# Patient Record
Sex: FEMALE | Race: WHITE | Employment: OTHER | URBAN - METROPOLITAN AREA
[De-identification: names, ages, dates, MRNs, and addresses within clinical notes are randomized per-mention and may not be internally consistent; named-entity substitution may affect disease eponyms.]

---

## 2018-03-21 ENCOUNTER — HOSPITAL ENCOUNTER (EMERGENCY)
Age: 83
Discharge: HOME | End: 2018-03-21
Attending: EMERGENCY MEDICINE
Payer: MEDICARE

## 2018-03-21 ENCOUNTER — APPOINTMENT (OUTPATIENT)
Dept: CT IMAGING | Age: 83
End: 2018-03-21
Attending: PHYSICIAN ASSISTANT
Payer: MEDICARE

## 2018-03-21 VITALS
OXYGEN SATURATION: 94 % | HEIGHT: 63 IN | TEMPERATURE: 97.5 F | RESPIRATION RATE: 18 BRPM | HEART RATE: 104 BPM | WEIGHT: 125 LBS | SYSTOLIC BLOOD PRESSURE: 194 MMHG | BODY MASS INDEX: 22.15 KG/M2 | DIASTOLIC BLOOD PRESSURE: 55 MMHG

## 2018-03-21 DIAGNOSIS — R00.1 BRADYCARDIA: ICD-10-CM

## 2018-03-21 DIAGNOSIS — I10 HYPERTENSION, UNSPECIFIED TYPE: ICD-10-CM

## 2018-03-21 DIAGNOSIS — R53.83 FATIGUE, UNSPECIFIED TYPE: Primary | ICD-10-CM

## 2018-03-21 DIAGNOSIS — I50.9 CONGESTIVE HEART FAILURE, UNSPECIFIED CONGESTIVE HEART FAILURE CHRONICITY, UNSPECIFIED CONGESTIVE HEART FAILURE TYPE: ICD-10-CM

## 2018-03-21 LAB
ALBUMIN SERPL-MCNC: 3.3 G/DL (ref 3.4–5)
ALBUMIN/GLOB SERPL: 0.9 {RATIO} (ref 0.8–1.7)
ALP SERPL-CCNC: 77 U/L (ref 45–117)
ALT SERPL-CCNC: 13 U/L (ref 13–56)
AMORPH CRY URNS QL MICRO: ABNORMAL
ANION GAP SERPL CALC-SCNC: 10 MMOL/L (ref 3–18)
APPEARANCE UR: CLEAR
AST SERPL-CCNC: 19 U/L (ref 15–37)
BACTERIA URNS QL MICRO: ABNORMAL /HPF
BASOPHILS # BLD: 0 K/UL (ref 0–0.06)
BASOPHILS NFR BLD: 0 % (ref 0–2)
BILIRUB SERPL-MCNC: 0.8 MG/DL (ref 0.2–1)
BILIRUB UR QL: NEGATIVE
BNP SERPL-MCNC: 7218 PG/ML (ref 0–1800)
BUN SERPL-MCNC: 26 MG/DL (ref 7–18)
BUN/CREAT SERPL: 17 (ref 12–20)
CALCIUM SERPL-MCNC: 8.7 MG/DL (ref 8.5–10.1)
CHLORIDE SERPL-SCNC: 106 MMOL/L (ref 100–108)
CK MB CFR SERPL CALC: 2.7 % (ref 0–4)
CK MB SERPL-MCNC: 1.3 NG/ML (ref 5–25)
CK SERPL-CCNC: 48 U/L (ref 26–192)
CO2 SERPL-SCNC: 26 MMOL/L (ref 21–32)
COLOR UR: YELLOW
CREAT SERPL-MCNC: 1.51 MG/DL (ref 0.6–1.3)
DIFFERENTIAL METHOD BLD: ABNORMAL
EOSINOPHIL # BLD: 0.2 K/UL (ref 0–0.4)
EOSINOPHIL NFR BLD: 5 % (ref 0–5)
EPITH CASTS URNS QL MICRO: 0 /LPF (ref 0–5)
ERYTHROCYTE [DISTWIDTH] IN BLOOD BY AUTOMATED COUNT: 14.6 % (ref 11.6–14.5)
GLOBULIN SER CALC-MCNC: 3.7 G/DL (ref 2–4)
GLUCOSE BLD STRIP.AUTO-MCNC: 113 MG/DL (ref 70–110)
GLUCOSE SERPL-MCNC: 110 MG/DL (ref 74–99)
GLUCOSE UR STRIP.AUTO-MCNC: NEGATIVE MG/DL
HCT VFR BLD AUTO: 36.7 % (ref 35–45)
HGB BLD-MCNC: 12.2 G/DL (ref 12–16)
HGB UR QL STRIP: ABNORMAL
KETONES UR QL STRIP.AUTO: NEGATIVE MG/DL
LEUKOCYTE ESTERASE UR QL STRIP.AUTO: NEGATIVE
LYMPHOCYTES # BLD: 1.3 K/UL (ref 0.9–3.6)
LYMPHOCYTES NFR BLD: 25 % (ref 21–52)
MAGNESIUM SERPL-MCNC: 2.3 MG/DL (ref 1.6–2.6)
MCH RBC QN AUTO: 30.5 PG (ref 24–34)
MCHC RBC AUTO-ENTMCNC: 33.2 G/DL (ref 31–37)
MCV RBC AUTO: 91.8 FL (ref 74–97)
MONOCYTES # BLD: 0.5 K/UL (ref 0.05–1.2)
MONOCYTES NFR BLD: 10 % (ref 3–10)
NEUTS SEG # BLD: 3.2 K/UL (ref 1.8–8)
NEUTS SEG NFR BLD: 60 % (ref 40–73)
NITRITE UR QL STRIP.AUTO: NEGATIVE
PH UR STRIP: 5 [PH] (ref 5–8)
PLATELET # BLD AUTO: 194 K/UL (ref 135–420)
PMV BLD AUTO: 10.2 FL (ref 9.2–11.8)
POTASSIUM SERPL-SCNC: 5.5 MMOL/L (ref 3.5–5.5)
PROT SERPL-MCNC: 7 G/DL (ref 6.4–8.2)
PROT UR STRIP-MCNC: 100 MG/DL
RBC # BLD AUTO: 4 M/UL (ref 4.2–5.3)
RBC #/AREA URNS HPF: ABNORMAL /HPF (ref 0–5)
SODIUM SERPL-SCNC: 142 MMOL/L (ref 136–145)
SP GR UR REFRACTOMETRY: 1.02 (ref 1–1.03)
TROPONIN I SERPL-MCNC: <0.02 NG/ML (ref 0–0.06)
UROBILINOGEN UR QL STRIP.AUTO: 0.2 EU/DL (ref 0.2–1)
WBC # BLD AUTO: 5.3 K/UL (ref 4.6–13.2)
WBC URNS QL MICRO: ABNORMAL /HPF (ref 0–5)

## 2018-03-21 PROCEDURE — 74011250637 HC RX REV CODE- 250/637: Performed by: PHYSICIAN ASSISTANT

## 2018-03-21 PROCEDURE — 80053 COMPREHEN METABOLIC PANEL: CPT | Performed by: EMERGENCY MEDICINE

## 2018-03-21 PROCEDURE — 77030034849

## 2018-03-21 PROCEDURE — 81001 URINALYSIS AUTO W/SCOPE: CPT | Performed by: EMERGENCY MEDICINE

## 2018-03-21 PROCEDURE — 83735 ASSAY OF MAGNESIUM: CPT | Performed by: EMERGENCY MEDICINE

## 2018-03-21 PROCEDURE — 51702 INSERT TEMP BLADDER CATH: CPT

## 2018-03-21 PROCEDURE — 83880 ASSAY OF NATRIURETIC PEPTIDE: CPT | Performed by: EMERGENCY MEDICINE

## 2018-03-21 PROCEDURE — 85025 COMPLETE CBC W/AUTO DIFF WBC: CPT | Performed by: EMERGENCY MEDICINE

## 2018-03-21 PROCEDURE — 93005 ELECTROCARDIOGRAM TRACING: CPT

## 2018-03-21 PROCEDURE — 82962 GLUCOSE BLOOD TEST: CPT

## 2018-03-21 PROCEDURE — 87086 URINE CULTURE/COLONY COUNT: CPT | Performed by: EMERGENCY MEDICINE

## 2018-03-21 PROCEDURE — 70450 CT HEAD/BRAIN W/O DYE: CPT

## 2018-03-21 PROCEDURE — 82550 ASSAY OF CK (CPK): CPT | Performed by: EMERGENCY MEDICINE

## 2018-03-21 PROCEDURE — 99285 EMERGENCY DEPT VISIT HI MDM: CPT

## 2018-03-21 RX ORDER — AMLODIPINE BESYLATE 5 MG/1
5 TABLET ORAL
Status: DISCONTINUED | OUTPATIENT
Start: 2018-03-21 | End: 2018-03-21

## 2018-03-21 RX ORDER — FUROSEMIDE 20 MG/1
TABLET ORAL
Qty: 10 TAB | Refills: 0 | Status: SHIPPED | OUTPATIENT
Start: 2018-03-21 | End: 2018-06-01

## 2018-03-21 RX ORDER — HYDROCHLOROTHIAZIDE 25 MG/1
25 TABLET ORAL
Status: COMPLETED | OUTPATIENT
Start: 2018-03-21 | End: 2018-03-21

## 2018-03-21 RX ORDER — FAMOTIDINE 20 MG/1
20 TABLET, FILM COATED ORAL 2 TIMES DAILY
COMMUNITY

## 2018-03-21 RX ADMIN — HYDROCHLOROTHIAZIDE 25 MG: 25 TABLET ORAL at 18:57

## 2018-03-21 NOTE — ED NOTES
Pt A&O x 4, speaking in full, clear sentences. Placed dominguez, pt tolerated well. Pt is bradycardic 42-49 bpm with SpO2 on RA between 93-95%. Started supportive oxygen 3 L/min NC. No further needs expressed.

## 2018-03-21 NOTE — ED PROVIDER NOTES
EMERGENCY DEPARTMENT HISTORY AND PHYSICAL EXAM    Date: 3/21/2018  Patient Name: Corey Brothers    History of Presenting Illness     Chief Complaint   Patient presents with    Fatigue         History Provided By: Patient    Chief Complaint: fatigue  Duration: 3 Hours  Timing:  Acute   Quality: generalized  Severity: Moderate  Modifying Factors: none  Associated Symptoms: elevated BP    Additional History (Context):   5:50 PM  Corey Brothers is a 80 y.o. female with PMHX of HTN who presents to the emergency department C/O fatigue. Associated sxs include elevated BP. Son reports the pt had received a call from [facility] that his mother had an elevated BP and was not responding. Pt was able to recall what she ate for breakfast and lunch. Pt states she has chronic right hip pain. PMHx includes cancer, HLD, and GERD. PSHx includes lower back surgery, appendectomy, right breast lumpectomy, and sentinel lymph node biopsy. Pt denies headache, SOB, chest pain, blurred vision, slurred speech, dysuria, hematuria, frequency, recent falls, use of blood thinners  and any other sxs or complaints. PCP: Bernice Law MD    Current Outpatient Prescriptions   Medication Sig Dispense Refill    famotidine (PEPCID) 20 mg tablet Take 20 mg by mouth two (2) times a day.  furosemide (LASIX) 20 mg tablet One tab daily 10 Tab 0    Aspirin, Buffered 81 mg tab Take  by mouth daily.  carica papaya (PAPAYA ENZYME) chew Take  by mouth daily.  omega-3 fatty acids-vitamin e (FISH OIL) 1,000 mg cap Take 1 Cap by mouth daily.  CALCIUM CARBONATE/VITAMIN D3 (CALTRATE 600 + D PO) Take  by mouth daily.  BIOTIN PO Take 1,000 mcg by mouth daily.  flaxseed oil 1,000 mg cap Take  by mouth daily.  ascorbic acid (VITAMIN C) 500 mg tablet Take 1,000 mg by mouth daily.  FEVERFEW PO Take  by mouth daily.  Cholecalciferol, Vitamin D3, 1,000 unit cap Take  by mouth daily.       cinnamon bark (CINNAMON) 500 mg cap Take 1,000 mg by mouth daily. Past History     Past Medical History:  Past Medical History:   Diagnosis Date    Cancer ([address]     right breast    GERD (gastroesophageal reflux disease)     Hyperlipidemia     Hypertension        Past Surgical History:  Past Surgical History:   Procedure Laterality Date    HX APPENDECTOMY  1943    NEUROLOGICAL PROCEDURE UNLISTED  2010    lower back surgery       Family History:  History reviewed. No pertinent family history. Social History:  Social History   Substance Use Topics    Smoking status: Never Smoker    Smokeless tobacco: Never Used    Alcohol use No       Allergies: Allergies   Allergen Reactions    Advil [Ibuprofen] Other (comments)     Upset stomach         Review of Systems   Review of Systems   Constitutional: Positive for fatigue.        (+) Elevated BP   Eyes: Negative for visual disturbance (blurred vision). Respiratory: Negative for shortness of breath. Cardiovascular: Negative for chest pain. Genitourinary: Negative for dysuria, frequency and hematuria. Neurological: Negative for speech difficulty (slurred speech) and headaches. All other systems reviewed and are negative. Physical Exam     Vitals:    03/21/18 2100 03/21/18 2130 03/21/18 2135 03/21/18 2212   BP: 190/46 183/42 198/45 194/55   Pulse: (!) 43 (!) 46 (!) 47 (!) 104   Resp: 26 16 18    Temp:       SpO2:  95% 95% 94%   Weight:       Height:         Physical Exam   Constitutional: She is oriented to person, place, and time. She appears well-developed and well-nourished. Alert, oriented x4, NAD    HENT:   Head: Normocephalic and atraumatic. Right Ear: Tympanic membrane, external ear and ear canal normal. No mastoid tenderness. Tympanic membrane is not perforated, not erythematous, not retracted and not bulging. No hemotympanum. Left Ear: Tympanic membrane, external ear and ear canal normal. No mastoid tenderness.  Tympanic membrane is not perforated, not erythematous, not retracted and not bulging. No hemotympanum. Nose: Nose normal.   Mouth/Throat: Uvula is midline, oropharynx is clear and moist and mucous membranes are normal. No trismus in the jaw. No uvula swelling. No oropharyngeal exudate, posterior oropharyngeal edema, posterior oropharyngeal erythema or tonsillar abscesses. Eyes: EOM are normal. Pupils are equal, round, and reactive to light. Neck: Normal range of motion. Neck supple. No spinous process tenderness and no muscular tenderness present. No rigidity. Normal range of motion present. No Brudzinski's sign and no Kernig's sign noted. No meningismus   No lymphadenopathy    Cardiovascular: Regular rhythm, normal heart sounds and intact distal pulses. Bradycardia present. No murmur heard. Pulmonary/Chest: Effort normal and breath sounds normal. No respiratory distress. She has no wheezes. She has no rales. Abdominal: Soft. Bowel sounds are normal. She exhibits no distension. There is no tenderness. There is no rebound and no guarding. Musculoskeletal: Normal range of motion. Neurological: She is alert and oriented to person, place, and time. She has normal strength. She displays no atrophy and no tremor. No cranial nerve deficit or sensory deficit. She exhibits normal muscle tone. Coordination and gait normal. GCS eye subscore is 4. GCS verbal subscore is 5. GCS motor subscore is 6. No neuro deficit   N/V intact distally   Strength 5/5 and equal in all extremities   No slurred speech   Left eyelid appears lower than right but pt sts this is chronic no other facial droop   Skin: Skin is warm and dry. Psychiatric: She has a normal mood and affect. Judgment normal.   Nursing note and vitals reviewed.         Diagnostic Study Results     Labs -     Recent Results (from the past 12 hour(s))   URINALYSIS W/ RFLX MICROSCOPIC    Collection Time: 03/21/18  5:50 PM   Result Value Ref Range    Color YELLOW      Appearance CLEAR      Specific gravity 1.020 1.005 - 1.030      pH (UA) 5.0 5.0 - 8.0      Protein 100 (A) NEG mg/dL    Glucose NEGATIVE  NEG mg/dL    Ketone NEGATIVE  NEG mg/dL    Bilirubin NEGATIVE  NEG      Blood SMALL (A) NEG      Urobilinogen 0.2 0.2 - 1.0 EU/dL    Nitrites NEGATIVE  NEG      Leukocyte Esterase NEGATIVE  NEG     URINE MICROSCOPIC ONLY    Collection Time: 03/21/18  5:50 PM   Result Value Ref Range    WBC 0 to 3 0 - 5 /hpf    RBC 0 to 3 0 - 5 /hpf    Epithelial cells 0 0 - 5 /lpf    Bacteria 1+ (A) NEG /hpf    Amorphous Crystals FEW (A) NEG     CBC WITH AUTOMATED DIFF    Collection Time: 03/21/18  6:18 PM   Result Value Ref Range    WBC 5.3 4.6 - 13.2 K/uL    RBC 4.00 (L) 4.20 - 5.30 M/uL    HGB 12.2 12.0 - 16.0 g/dL    HCT 36.7 35.0 - 45.0 %    MCV 91.8 74.0 - 97.0 FL    MCH 30.5 24.0 - 34.0 PG    MCHC 33.2 31.0 - 37.0 g/dL    RDW 14.6 (H) 11.6 - 14.5 %    PLATELET 427 613 - 191 K/uL    MPV 10.2 9.2 - 11.8 FL    NEUTROPHILS 60 40 - 73 %    LYMPHOCYTES 25 21 - 52 %    MONOCYTES 10 3 - 10 %    EOSINOPHILS 5 0 - 5 %    BASOPHILS 0 0 - 2 %    ABS. NEUTROPHILS 3.2 1.8 - 8.0 K/UL    ABS. LYMPHOCYTES 1.3 0.9 - 3.6 K/UL    ABS. MONOCYTES 0.5 0.05 - 1.2 K/UL    ABS. EOSINOPHILS 0.2 0.0 - 0.4 K/UL    ABS. BASOPHILS 0.0 0.0 - 0.06 K/UL    DF AUTOMATED     METABOLIC PANEL, COMPREHENSIVE    Collection Time: 03/21/18  6:18 PM   Result Value Ref Range    Sodium 142 136 - 145 mmol/L    Potassium 5.5 3.5 - 5.5 mmol/L    Chloride 106 100 - 108 mmol/L    CO2 26 21 - 32 mmol/L    Anion gap 10 3.0 - 18 mmol/L    Glucose 110 (H) 74 - 99 mg/dL    BUN 26 (H) 7.0 - 18 MG/DL    Creatinine 1.51 (H) 0.6 - 1.3 MG/DL    BUN/Creatinine ratio 17 12 - 20      GFR est AA 39 (L) >60 ml/min/1.73m2    GFR est non-AA 32 (L) >60 ml/min/1.73m2    Calcium 8.7 8.5 - 10.1 MG/DL    Bilirubin, total 0.8 0.2 - 1.0 MG/DL    ALT (SGPT) 13 13 - 56 U/L    AST (SGOT) 19 15 - 37 U/L    Alk.  phosphatase 77 45 - 117 U/L    Protein, total 7.0 6.4 - 8.2 g/dL Albumin 3.3 (L) 3.4 - 5.0 g/dL    Globulin 3.7 2.0 - 4.0 g/dL    A-G Ratio 0.9 0.8 - 1.7     MAGNESIUM    Collection Time: 03/21/18  6:18 PM   Result Value Ref Range    Magnesium 2.3 1.6 - 2.6 mg/dL   CARDIAC PANEL,(CK, CKMB & TROPONIN)    Collection Time: 03/21/18  6:18 PM   Result Value Ref Range    CK 48 26 - 192 U/L    CK - MB 1.3 <3.6 ng/ml    CK-MB Index 2.7 0.0 - 4.0 %    Troponin-I, Qt. <0.02 0.00 - 0.06 NG/ML   NT-PRO BNP    Collection Time: 03/21/18  6:18 PM   Result Value Ref Range    NT pro-BNP 7218 (H) 0 - 1800 PG/ML   GLUCOSE, POC    Collection Time: 03/21/18  6:41 PM   Result Value Ref Range    Glucose (POC) 113 (H) 70 - 110 mg/dL       Radiologic Studies -   CT HEAD WO CONT   Final Result   IMPRESSION:     No acute intracranial abnormalities. As read by the radiologist.     [employer] Results  (Last 48 hours)               03/21/18 2005  CT HEAD WO CONT Final result    Impression:  IMPRESSION:       No acute intracranial abnormalities. Narrative:  EXAM: CT head       INDICATION: Elevated blood pressure       COMPARISON: None. TECHNIQUE: Axial CT imaging of the head was performed without intravenous   contrast.       DOSE REDUCTION:  One or more dose reduction techniques were used on this CT:   automated exposure control, adjustment of the mAs and/or kVp according to   patient's size, and iterative reconstruction techniques. The specific techniques   utilized on this CT exam have been documented in the patient's electronic   medical record.       _______________       FINDINGS:       BRAIN AND POSTERIOR FOSSA: There is age-appropriate atrophy. There is no   intracranial hemorrhage, mass effect, or midline shift. Mild to moderate small   vessel ischemic disease present. EXTRA-AXIAL SPACES AND MENINGES: There are no abnormal extra-axial fluid   collections. CALVARIUM: Intact. SINUSES: Clear.        OTHER: None.       _______________               CXR Results  (Last 48 hours) None          Medications given in the ED-  Medications   hydroCHLOROthiazide (HYDRODIURIL) tablet 25 mg (25 mg Oral Given 3/21/18 1857)         Medical Decision Making   I am the first provider for this patient. I reviewed the vital signs, available nursing notes, past medical history, past surgical history, family history and social history. Vital Signs-Reviewed the patient's vital signs. Pulse Oximetry Analysis - 95% on RA     Cardiac Monitor:  Rate: 43 bpm  Rhythm: Sinus bradycardia    EKG interpretation: (Preliminary)  5:21 PM   43 bpm; sinus bradycardia, LBBB, No STEMI  EKG read by Matt Anthony PA-C at 6:04 PM     Records Reviewed: Old Medical Records    Provider Notes (Medical Decision Making):    Procedures:  Procedures    ED Course:   5:50 PM  Initial assessment performed. The patients presenting problems have been discussed, and they are in agreement with the care plan formulated and outlined with them. I have encouraged them to ask questions as they arise throughout their visit.    6:03 PM Discussed patient's history, exam, and available diagnostics results with Fuad Haines MD, Emergency Medicine, who agrees with plan and recommends HCTZ 25 mg for BP. FACE-TO-FACE PROGRESS NOTE:  6:12 PM  Was requested to see pt by the OUSMANE. Evaluated pt face-to-face. 80year old. PMHx of HTN (Diet controlled). Came here with generalized weakness since this morning. No chest pain or SOB. No hx of same. EKG with sinus bradycardia. Will check electrolytes and Troponin and evaluate for symptomatic bradycardia. Blood pressure elevated. Will give dose of HCTZ. Written by You Jewell, ED Scribe, as dictated by Fuad Haines MD.     9:31 PM Discussed patient's history, exam, and available diagnostics results with Susan Perdue MD, cardiology, who states does not feel the pt needs emergent pacing. The pt could be seen as an out-pt. 10:05 PM (progress note) informed pt and son of all results.  Pt sts she is feeling well and would like to go home. Pt ambulated in ED without dizziness or weakness. CONSULT NOTE:   Petra Rogers PA-C spoke with Vahid Izaguirre MD   Specialty: ED attending  Discussed pt's hx, disposition, and available diagnostic and imaging results. Reviewed care plans. Consulting physician agrees with plans as outlined. Agrees pt is stable for d/c. Recommends d/c on 20 mg Lasix. Written by Mary Cuenca PA-C        Diagnosis and Disposition       DISCHARGE NOTE:  10:14 PM  Tamy Frey's  results have been reviewed with her. She has been counseled regarding her diagnosis, treatment, and plan. She verbally conveys understanding and agreement of the signs, symptoms, diagnosis, treatment and prognosis and additionally agrees to follow up as discussed. She also agrees with the care-plan and conveys that all of her questions have been answered. I have also provided discharge instructions for her that include: educational information regarding their diagnosis and treatment, and list of reasons why they would want to return to the ED prior to their follow-up appointment, should her condition change. She has been provided with education for proper emergency department utilization. CLINICAL IMPRESSION:    1. Fatigue, unspecified type    2. Congestive heart failure, unspecified congestive heart failure chronicity, unspecified congestive heart failure type ([address]    3. Bradycardia    4. Hypertension, unspecified type        PLAN:  1. D/C Home  2. Discharge Medication List as of 3/21/2018 10:14 PM      START taking these medications    Details   furosemide (LASIX) 20 mg tablet One tab daily, Print, Disp-10 Tab, R-0         CONTINUE these medications which have NOT CHANGED    Details   famotidine (PEPCID) 20 mg tablet Take 20 mg by mouth two (2) times a day., Historical Med      Aspirin, Buffered 81 mg tab Take  by mouth daily. , Historical Med      carica papaya (PAPAYA ENZYME) chew Take  by mouth daily., Historical Med      omega-3 fatty acids-vitamin e (FISH OIL) 1,000 mg cap Take 1 Cap by mouth daily. , Historical Med      CALCIUM CARBONATE/VITAMIN D3 (CALTRATE 600 + D PO) Take  by mouth daily. , Historical Med      BIOTIN PO Take 1,000 mcg by mouth daily. , Historical Med      flaxseed oil 1,000 mg cap Take  by mouth daily. , Historical Med      ascorbic acid (VITAMIN C) 500 mg tablet Take 1,000 mg by mouth daily. , Historical Med      FEVERFEW PO Take  by mouth daily. , Historical Med      Cholecalciferol, Vitamin D3, 1,000 unit cap Take  by mouth daily. , Historical Med      cinnamon bark (CINNAMON) 500 mg cap Take 1,000 mg by mouth daily. , Historical Med           3. Follow-up Information     Follow up With Details Comments Contact Info    Roz Curran MD Schedule an appointment as soon as possible for a visit in 2 days For follow up with cardiology [address] [facility] [address]  [phone]      [facility] EMERGENCY DEPT Go to As needed, if symptoms worsen [address] [facility] [address]  [phone]        _______________________________    Attestations: This note is prepared by Alta Cervantes, acting as Scribe for Tevin Velez PA-C. Tevin Velez PA-C: The scribe's documentation has been prepared under my direction and personally reviewed by me in its entirety. I confirm that the note above accurately reflects all work, treatment, procedures, and medical decision making performed by me.

## 2018-03-21 NOTE — ED TRIAGE NOTES
Patient is a resident of [address]. Staff called son and stated the patient was not responding. Had an elevated blood pressure and was not feeling well. Sepsis Screening completed    (  )Patient meets SIRS criteria. (x  )Patient does not meet SIRS criteria.       SIRS Criteria is achieved when two or more of the following are present   Temperature < 96.8°F (36°C) or > 100.9°F (38.3°C)   Heart Rate > 90 beats per minute   Respiratory Rate > 20 breaths per minute   WBC count > 12,000 or <4,000 or > 10% bands

## 2018-03-22 NOTE — ED NOTES
Pt was ambulate with walker in hallway, did not become sob, spo2 94%RA, pulse 78. Notified PA, ok for pt to be dc.

## 2018-03-22 NOTE — DISCHARGE INSTRUCTIONS
Bradycardia: Care Instructions  Your Care Instructions    Bradycardia is a slow heart rate. If your heart beats too slowly, it can't supply your body with enough blood. This can make you weak or dizzy. Or it may make you pass out. Sometimes medicine can cause this problem. If this happens, your doctor may have you adjust one of your medicines. If a medicine is not the problem, your doctor may recommend a pacemaker. It is important to treat bradycardia so that you don't get more serious health problems. Your doctor will want to see you on a routine schedule to make sure that your heartbeat is normal.  Follow-up care is a key part of your treatment and safety. Be sure to make and go to all appointments, and call your doctor if you are having problems. It's also a good idea to know your test results and keep a list of the medicines you take. How can you care for yourself at home? · Take your medicines exactly as prescribed. Call your doctor if you think you are having a problem with your medicine. If your bradycardia is caused by another disease, your doctor will try to treat the disease. If it is caused by heart medicines, he or she will adjust your medicines. · Make lifestyle changes to improve your heart health. ¨ Get regular exercise. Try for 30 minutes on most days of the week. If you do not have other heart problems, you likely do not have limits on the type or level of activity that you can do. You may want to walk, swim, bike, or do other activities. Ask your doctor what level of exercise is safe for you. ¨ To control your cholesterol, avoid foods with a lot of fat, saturated fat, or sodium. Try to eat more fiber. And if your doctor says it's okay, get some exercise on most days. ¨ Do not smoke. Smoking can make your heart condition worse. If you need help quitting, talk to your doctor about stop-smoking programs and medicines. These can increase your chances of quitting for good.   ¨ Limit alcohol to 2 drinks a day for men and 1 drink a day for women. Too much alcohol can cause health problems. Pacemaker  If you have a pacemaker, you will get more specific information about it. Be sure to:  · Check your pulse as your doctor tells you. · Have your pacemaker checked as often as your doctor recommends. You may be able to do this over the phone or computer. · Avoid strong magnetic or electrical fields. These include wand metal detectors used in airports, MRIs, welding equipment, and generators. · You will be checked several times right after you get your pacemaker and when it is time to have the battery changed. Batteries last for 5 to 15 years. · You can talk on a cell phone. But keep it 6 inches away from your pacemaker. · Microwaves, TVs, radios, and kitchen and bathroom appliances won't harm you. When should you call for help? Call 911 anytime you think you may need emergency care. For example, call if:  ? · You have symptoms of sudden heart failure. These may include:  ¨ Severe trouble breathing. ¨ A fast or irregular heartbeat. ¨ Coughing up pink, foamy mucus. ¨ You passed out. ? · You have symptoms of a stroke. These may include:  ¨ Sudden numbness, tingling, weakness, or loss of movement in your face, arm, or leg, especially on only one side of your body. ¨ Sudden vision changes. ¨ Sudden trouble speaking. ¨ Sudden confusion or trouble understanding simple statements. ¨ Sudden problems with walking or balance. ¨ A sudden, severe headache that is different from past headaches. ?Call your doctor now or seek immediate medical care if:  ? · You have new or changed symptoms of heart failure, such as:  ¨ New or increased shortness of breath. ¨ New or worse swelling in your legs, ankles, or feet. ¨ Sudden weight gain, such as more than 2 to 3 pounds in a day or 5 pounds in a week.  (Your doctor may suggest a different range of weight gain.)  ¨ Feeling dizzy or lightheaded or like you may faint.  ¨ Feeling so tired or weak that you cannot do your usual activities. ¨ Not sleeping well. Shortness of breath wakes you at night. You need extra pillows to prop yourself up to breathe easier. ? Watch closely for changes in your health, and be sure to contact your doctor if:  ? · You do not get better as expected. Where can you learn more? Go to http://carlos-chelsea.info/. Enter L583 in the search box to learn more about \"Bradycardia: Care Instructions. \"  Current as of: September 21, 2016  Content Version: 11.4  © 3424-3900 [employer]. Care instructions adapted under license by [employer] (which disclaims liability or warranty for this information). If you have questions about a medical condition or this instruction, always ask your healthcare professional. [address] any warranty or liability for your use of this information. Learning About High Blood Pressure  What is high blood pressure? Blood pressure is a measure of how hard the blood pushes against the walls of your arteries. It's normal for blood pressure to go up and down throughout the day, but if it stays up, you have high blood pressure. Another name for high blood pressure is hypertension. Two numbers tell you your blood pressure. The first number is the systolic pressure. It shows how hard the blood pushes when your heart is pumping. The second number is the diastolic pressure. It shows how hard the blood pushes between heartbeats, when your heart is relaxed and filling with blood. A blood pressure of less than 120/80 (say \"120 over 80\") is ideal for an adult. High blood pressure is 140/90 or higher. You have high blood pressure if your top number is 140 or higher or your bottom number is 90 or higher, or both. Many people fall into the category in between, called prehypertension.  People with prehypertension need to make lifestyle changes to bring their blood pressure down and help prevent or delay high blood pressure. What happens when you have high blood pressure? · Blood flows through your arteries with too much force. Over time, this damages the walls of your arteries. But you can't feel it. High blood pressure usually doesn't cause symptoms. · Fat and calcium start to build up in your arteries. This buildup is called plaque. Plaque makes your arteries narrower and stiffer. Blood can't flow through them as easily. · This lack of good blood flow starts to damage some of the organs in your body. This can lead to problems such as coronary artery disease and heart attack, heart failure, stroke, kidney failure, and eye damage. How can you prevent high blood pressure? · Stay at a healthy weight. · Try to limit how much sodium you eat to less than 2,300 milligrams (mg) a day. If you limit your sodium to 1,500 mg a day, you can lower your blood pressure even more. ¨ Buy foods that are labeled \"unsalted,\" \"sodium-free,\" or \"low-sodium. \" Foods labeled \"reduced-sodium\" and \"light sodium\" may still have too much sodium. ¨ Flavor your food with garlic, lemon juice, onion, vinegar, herbs, and spices instead of salt. Do not use soy sauce, steak sauce, onion salt, garlic salt, mustard, or ketchup on your food. ¨ Use less salt (or none) when recipes call for it. You can often use half the salt a recipe calls for without losing flavor. · Be physically active. Get at least 30 minutes of exercise on most days of the week. Walking is a good choice. You also may want to do other activities, such as running, swimming, cycling, or playing tennis or team sports. · Limit alcohol to 2 drinks a day for men and 1 drink a day for women. · Eat plenty of fruits, vegetables, and low-fat dairy products. Eat less saturated and total fats. How is high blood pressure treated? · Your doctor will suggest making lifestyle changes.  For example, your doctor may ask you to eat healthy foods, quit smoking, lose extra weight, and be more active. · If lifestyle changes don't help enough or your blood pressure is very high, you will have to take medicine every day. Follow-up care is a key part of your treatment and safety. Be sure to make and go to all appointments, and call your doctor if you are having problems. It's also a good idea to know your test results and keep a list of the medicines you take. Where can you learn more? Go to http://carlos-chelsea.info/. Enter P501 in the search box to learn more about \"Learning About High Blood Pressure. \"  Current as of: September 21, 2016  Content Version: 11.4  © 8324-8817 [employer]. Care instructions adapted under license by [employer] (which disclaims liability or warranty for this information). If you have questions about a medical condition or this instruction, always ask your healthcare professional. [place] [address] any warranty or liability for your use of this information. Heart Failure: Care Instructions  Your Care Instructions    Heart failure occurs when your heart does not pump as much blood as the body needs. Failure does not mean that the heart has stopped pumping but rather that it is not pumping as well as it should. Over time, this causes fluid buildup in your lungs and other parts of your body. Fluid buildup can cause shortness of breath, fatigue, swollen ankles, and other problems. By taking medicines regularly, reducing sodium (salt) in your diet, checking your weight every day, and making lifestyle changes, you can feel better and live longer. Follow-up care is a key part of your treatment and safety. Be sure to make and go to all appointments, and call your doctor if you are having problems. It's also a good idea to know your test results and keep a list of the medicines you take. How can you care for yourself at home? Medicines  ? · Be safe with medicines.  Take your medicines exactly as prescribed. Call your doctor if you think you are having a problem with your medicine. ? · Do not take any vitamins, over-the-counter medicine, or herbal products without talking to your doctor first. Joseph Mendoza not take ibuprofen (Advil or Motrin) and naproxen (Aleve) without talking to your doctor first. They could make your heart failure worse. ? · You may be taking some of the following medicine. ¨ Beta-blockers can slow heart rate, decrease blood pressure, and improve your condition. Taking a beta-blocker may lower your chance of needing to be hospitalized. ¨ Angiotensin-converting enzyme inhibitors (ACEIs) reduce the heart's workload, lower blood pressure, and reduce swelling. Taking an ACEI may lower your chance of needing to be hospitalized again. ¨ Angiotensin II receptor blockers (ARBs) work like ACEIs. Your doctor may prescribe them instead of ACEIs. ¨ Diuretics, also called water pills, reduce swelling. ¨ Potassium supplements replace this important mineral, which is sometimes lost with diuretics. ¨ Aspirin and other blood thinners prevent blood clots, which can cause a stroke or heart attack. ? You will get more details on the specific medicines your doctor prescribes. Diet  ? · Your doctor may suggest that you limit sodium to 2,000 milligrams (mg) a day or less. That is less than 1 teaspoon of salt a day, including all the salt you eat in cooking or in packaged foods. People get most of their sodium from processed foods. Fast food and restaurant meals also tend to be very high in sodium. ? · Ask your doctor how much liquid you can drink each day. You may have to limit liquids. ?Weight  ? · Weigh yourself without clothing at the same time each day. Record your weight. Call your doctor if you have a sudden weight gain, such as more than 2 to 3 pounds in a day or 5 pounds in a week.  (Your doctor may suggest a different range of weight gain.) A sudden weight gain may mean that your heart failure is getting worse. ? Activity level  ? · Start light exercise (if your doctor says it is okay). Even if you can only do a small amount, exercise will help you get stronger, have more energy, and manage your weight and your stress. Walking is an easy way to get exercise. Start out by walking a little more than you did before. Bit by bit, increase the amount you walk. ? · When you exercise, watch for signs that your heart is working too hard. You are pushing yourself too hard if you cannot talk while you are exercising. If you become short of breath or dizzy or have chest pain, stop, sit down, and rest.   ? · If you feel \"wiped out\" the day after you exercise, walk slower or for a shorter distance until you can work up to a better pace. ? · Get enough rest at night. Sleeping with 1 or 2 pillows under your upper body and head may help you breathe easier. ? Lifestyle changes  ? · Do not smoke. Smoking can make a heart condition worse. If you need help quitting, talk to your doctor about stop-smoking programs and medicines. These can increase your chances of quitting for good. Quitting smoking may be the most important step you can take to protect your heart. ? · Limit alcohol to 2 drinks a day for men and 1 drink a day for women. Too much alcohol can cause health problems. ? · Avoid getting sick from colds and the flu. Get a pneumococcal vaccine shot. If you have had one before, ask your doctor whether you need another dose. Get a flu shot each year. If you must be around people with colds or the flu, wash your hands often. When should you call for help? Call 911 if you have symptoms of sudden heart failure such as:  ? · You have severe trouble breathing. ? · You cough up pink, foamy mucus. ? · You have a new irregular or rapid heartbeat. ?Call your doctor now or seek immediate medical care if:  ? · You have new or increased shortness of breath.    ? · You are dizzy or lightheaded, or you feel like you may faint. ? · You have sudden weight gain, such as more than 2 to 3 pounds in a day or 5 pounds in a week. (Your doctor may suggest a different range of weight gain.)   ? · You have increased swelling in your legs, ankles, or feet. ? · You are suddenly so tired or weak that you cannot do your usual activities. ? Watch closely for changes in your health, and be sure to contact your doctor if you develop new symptoms. Where can you learn more? Go to http://carlos-chelsea.info/. Enter T021 in the search box to learn more about \"Heart Failure: Care Instructions. \"  Current as of: September 21, 2016  Content Version: 11.4  © 8583-4846 [employer]. Care instructions adapted under license by [employer] (which disclaims liability or warranty for this information). If you have questions about a medical condition or this instruction, always ask your healthcare professional. [address] any warranty or liability for your use of this information. Fatigue: Care Instructions  Your Care Instructions    Fatigue is a feeling of tiredness, exhaustion, or lack of energy. You may feel fatigue because of too much or not enough activity. It can also come from stress, lack of sleep, boredom, and poor diet. Many medical problems, such as viral infections, can cause fatigue. Emotional problems, especially depression, are often the cause of fatigue. Fatigue is most often a symptom of another problem. Treatment for fatigue depends on the cause. For example, if you have fatigue because you have a certain health problem, treating this problem also treats your fatigue. If depression or anxiety is the cause, treatment may help. Follow-up care is a key part of your treatment and safety. Be sure to make and go to all appointments, and call your doctor if you are having problems.  It's also a good idea to know your test results and keep a list of the medicines you take. How can you care for yourself at home? · Get regular exercise. But don't overdo it. Go back and forth between rest and exercise. · Get plenty of rest.  · Eat a healthy diet. Do not skip meals, especially breakfast.  · Reduce your use of caffeine, tobacco, and alcohol. Caffeine is most often found in coffee, tea, cola drinks, and chocolate. · Limit medicines that can cause fatigue. This includes tranquilizers and cold and allergy medicines. When should you call for help? Watch closely for changes in your health, and be sure to contact your doctor if:  ? · You have new symptoms such as fever or a rash. ? · Your fatigue gets worse. ? · You have been feeling down, depressed, or hopeless. Or you may have lost interest in things that you usually enjoy. ? · You are not getting better as expected. Where can you learn more? Go to http://carlos-chelsea.info/. Enter J118 in the search box to learn more about \"Fatigue: Care Instructions. \"  Current as of: March 20, 2017  Content Version: 11.4  © 8591-4531 [employer]. Care instructions adapted under license by [employer] (which disclaims liability or warranty for this information). If you have questions about a medical condition or this instruction, always ask your healthcare professional. [address] any warranty or liability for your use of this information.

## 2018-03-22 NOTE — ED NOTES
I have reviewed discharge instructions with the caregiver. The caregiver verbalized understanding.       Patient armband removed and shredded

## 2018-03-24 LAB
BACTERIA SPEC CULT: NORMAL
SERVICE CMNT-IMP: NORMAL

## 2018-03-25 LAB
ATRIAL RATE: 43 BPM
CALCULATED P AXIS, ECG09: 45 DEGREES
CALCULATED R AXIS, ECG10: -50 DEGREES
CALCULATED T AXIS, ECG11: 105 DEGREES
DIAGNOSIS, 93000: NORMAL
P-R INTERVAL, ECG05: 174 MS
Q-T INTERVAL, ECG07: 526 MS
QRS DURATION, ECG06: 144 MS
QTC CALCULATION (BEZET), ECG08: 444 MS
VENTRICULAR RATE, ECG03: 43 BPM

## 2018-04-24 ENCOUNTER — HOSPITAL ENCOUNTER (EMERGENCY)
Age: 83
Discharge: HOME | End: 2018-04-24
Attending: EMERGENCY MEDICINE | Admitting: EMERGENCY MEDICINE
Payer: MEDICARE

## 2018-04-24 ENCOUNTER — APPOINTMENT (OUTPATIENT)
Dept: CT IMAGING | Age: 83
End: 2018-04-24
Attending: EMERGENCY MEDICINE
Payer: MEDICARE

## 2018-04-24 VITALS
RESPIRATION RATE: 16 BRPM | BODY MASS INDEX: 23.74 KG/M2 | WEIGHT: 134 LBS | TEMPERATURE: 97.8 F | HEART RATE: 46 BPM | DIASTOLIC BLOOD PRESSURE: 104 MMHG | SYSTOLIC BLOOD PRESSURE: 124 MMHG | OXYGEN SATURATION: 100 %

## 2018-04-24 DIAGNOSIS — S01.01XA LACERATION OF SCALP, INITIAL ENCOUNTER: ICD-10-CM

## 2018-04-24 DIAGNOSIS — S09.90XA MINOR HEAD INJURY, INITIAL ENCOUNTER: Primary | ICD-10-CM

## 2018-04-24 PROCEDURE — 99282 EMERGENCY DEPT VISIT SF MDM: CPT

## 2018-04-24 PROCEDURE — 70450 CT HEAD/BRAIN W/O DYE: CPT

## 2018-04-24 RX ORDER — FLUTICASONE PROPIONATE 50 MCG
2 SPRAY, SUSPENSION (ML) NASAL DAILY
COMMUNITY

## 2018-04-24 RX ORDER — POTASSIUM CHLORIDE 20 MEQ/1
TABLET, EXTENDED RELEASE ORAL 2 TIMES DAILY
COMMUNITY
End: 2018-06-18

## 2018-04-24 NOTE — ED NOTES
Pt fell this am at 0700 and was taken to [address] [place] [address] and received 7 staples to right parietal.  Son was instructed to take pt to ER for CT of head. Son needed to go to work and brought pt here now.

## 2018-04-24 NOTE — ED PROVIDER NOTES
[address]  EMERGENCY DEPARTMENT HISTORY AND PHYSICAL EXAM    Date: 4/24/2018  Patient Name: Brett Nolen  YOB: 1924  Medical Record Number: [id number]    History of Presenting Illness     Chief Complaint   Patient presents with    Fall       History Provided By: Patient and Patient's Son    Chief Complaint: Fall  Duration: 12.5 Hours  Timing:  Acute  Quality: Mechanical  Severity: Mild  Modifying Factors: No relieving or worsening factors   Associated Symptoms: Laceration to right parietal area    Additional History (Context):   7:37 PM  Brett Nolen is a 80 y.o. female with PMHX right breast CA, HTN, HLD, risk for falls, gout, arthritis, & osteomalacia, who presents to the emergency department s/p fall 12.5 hours ago. Associated symptoms include laceration to right parietal area. Pt states she was walking across the living room when she over a rug and fell over her walker. Son reports pt was initially taken to [facility] and was referred to ER for head CT; however, son was unable to bring pt to ED until now. Pt received 7 staples to right parietal area while at [facility]. Pt currently takes medications for bradycardia. No pertinent FHx. Pt denies CP, dizziness, neck pain, visual disturbances, N/V, slurred speech, numbness/tingling/weakness, known injury, known LOC, and any other Sx or complaints. Shx: -tobacco use, -EtOH use, -illicit drug use    PCP: Irineo Corrales MD    Current Outpatient Prescriptions   Medication Sig Dispense Refill    dextran 70-hypromellose (ARTIFICIAL TEARS,[id number],) ophthalmic solution Administer  to both eyes as needed.  fluticasone (FLONASE) 50 mcg/actuation nasal spray 2 Sprays by Both Nostrils route daily.  potassium chloride (K-DUR, KLOR-CON) 20 mEq tablet Take  by mouth two (2) times a day.  peg 400-propylene glycol (SYSTANE, PROPYLENE GLYCOL,) 0.4-0.3 % drop as needed.       famotidine (PEPCID) 20 mg tablet Take 20 mg by mouth two (2) times a day.  furosemide (LASIX) 20 mg tablet One tab daily 10 Tab 0    Aspirin, Buffered 81 mg tab Take  by mouth daily.  carica papaya (PAPAYA ENZYME) chew Take  by mouth daily.  omega-3 fatty acids-vitamin e (FISH OIL) 1,000 mg cap Take 1 Cap by mouth daily.  CALCIUM CARBONATE/VITAMIN D3 (CALTRATE 600 + D PO) Take  by mouth daily.  BIOTIN PO Take 1,000 mcg by mouth daily.  flaxseed oil 1,000 mg cap Take  by mouth daily.  ascorbic acid (VITAMIN C) 500 mg tablet Take 1,000 mg by mouth daily.  FEVERFEW PO Take  by mouth daily.  Cholecalciferol, Vitamin D3, 1,000 unit cap Take  by mouth daily.  cinnamon bark (CINNAMON) 500 mg cap Take 1,000 mg by mouth daily. Past History     Past Medical History:  Past Medical History:   Diagnosis Date    Arthritis     Cancer ([facility] [address]     right breast    Cardiac murmur     Fall     GERD (gastroesophageal reflux disease)     GERD (gastroesophageal reflux disease)     Gout     Hyperlipidemia     Hyperlipidemia     Hypertension     Osteomalacia     Risk for falls        Past Surgical History:  Past Surgical History:   Procedure Laterality Date    HX APPENDECTOMY  1943    NEUROLOGICAL PROCEDURE UNLISTED  2010    lower back surgery       Family History:  No family history on file. Social History:  Social History   Substance Use Topics    Smoking status: Never Smoker    Smokeless tobacco: Never Used    Alcohol use No       Allergies: Allergies   Allergen Reactions    Advil [Ibuprofen] Other (comments)     Upset stomach         Review of Systems     Review of Systems   Constitutional: Negative for chills, diaphoresis, fever and unexpected weight change. HENT: Negative for congestion, drooling, ear pain, rhinorrhea, sore throat, tinnitus and trouble swallowing. Eyes: Negative for photophobia, pain, redness and visual disturbance.    Respiratory: Negative for cough, choking, chest tightness, shortness of breath, wheezing and stridor. Cardiovascular: Negative for chest pain, palpitations and leg swelling. Gastrointestinal: Negative for abdominal distention, abdominal pain, anal bleeding, blood in stool, constipation, diarrhea, nausea and vomiting. Genitourinary: Negative for difficulty urinating, dysuria, flank pain, frequency, hematuria and urgency. Musculoskeletal: Negative for arthralgias, back pain and neck pain. Skin: Positive for wound (laceration to right parietal area closed with staples). Negative for color change and rash. Neurological: Negative for dizziness, seizures, syncope, speech difficulty, light-headedness and headaches. Hematological: Does not bruise/bleed easily. Psychiatric/Behavioral: Negative for agitation, behavioral problems, hallucinations, self-injury and suicidal ideas. The patient is not hyperactive. Physical Exam     Vitals:    04/24/18 1850   BP: (!) 124/104   Pulse: (!) 46   Resp: 16   Temp: 97.8 °F (36.6 °C)   SpO2: 100%   Weight: 60.8 kg (134 lb)     Physical Exam   Constitutional: She is oriented to person, place, and time. She appears well-developed and well-nourished. Non-toxic appearance. She does not appear ill. No distress. Well appearing, nontoxic   HENT:   Head: Normocephalic. Head is with laceration. Right Ear: External ear normal.   Left Ear: External ear normal.   Mouth/Throat: Oropharynx is clear and moist. No oropharyngeal exudate. Well approximated 7 cm laceration to right parietal head   Eyes: Conjunctivae and EOM are normal. Pupils are equal, round, and reactive to light. No scleral icterus. No pallor   Neck: Normal range of motion. Neck supple. No JVD present. No tracheal deviation present. No thyromegaly present. Cardiovascular: Normal rate, regular rhythm and normal heart sounds. Pulmonary/Chest: Effort normal and breath sounds normal. No stridor. No respiratory distress. Abdominal: Soft.  Bowel sounds are normal. She exhibits no distension. There is no tenderness. There is no rebound and no guarding. Musculoskeletal: Normal range of motion. She exhibits no edema or tenderness. No soft tissue injuries   Lymphadenopathy:     She has no cervical adenopathy. Neurological: She is alert and oriented to person, place, and time. She has normal reflexes. No cranial nerve deficit. Coordination normal.   Skin: Skin is warm and dry. No rash noted. She is not diaphoretic. No erythema. Psychiatric: She has a normal mood and affect. Her behavior is normal. Judgment and thought content normal.   Nursing note and vitals reviewed. Diagnostic Study Results     Labs -   No results found for this or any previous visit (from the past 12 hour(s)). Radiologic Studies -   CT Results  (Last 48 hours)               04/24/18 2010  CT HEAD WO CONT Final result    Impression:  IMPRESSION:       1. No acute intracranial process, specifically, no evidence of intracranial   hemorrhage, mass effect, or midline shift. 2. Senescent changes of the brain including cortical atrophy and findings most   suggestive of chronic microvascular ischemia. Please note that CT may be negative in the setting of acute infarction. Narrative:  EXAM: CT head       INDICATION: Traumatic head injury. COMPARISON: 3/21/2018. TECHNIQUE: Axial CT imaging of the head was performed without intravenous   contrast.       One or more dose reduction techniques were used on this CT: automated exposure   control, adjustment of the mAs and/or kVp according to patient's size, and   iterative reconstruction techniques. The specific techniques utilized on this CT   exam have been documented in the patient's electronic medical record.   _______________       FINDINGS:       BRAIN AND POSTERIOR FOSSA: There is no intracranial hemorrhage, mass effect, or   midline shift.  There is a moderate degree of sulcal enlargement and ventricular dilatation consistent with cortical atrophy. There is hypoattenuation of the   periventricular white matter, which is nonspecific but most likely represents   changes from chronic microangiopathy. EXTRA-AXIAL SPACES AND MENINGES: There are no abnormal extra-axial fluid   collections. CALVARIUM: Intact. SINUSES: Clear. OTHER: None.       _______________                 Medications Given in the ED:  Medications - No data to display     Medical Decision Making     I am the first provider for this patient. I reviewed the vital signs, available nursing notes, past medical history, past surgical history, family history and social history. Records Reviewed: Nursing Notes and Previous Radiology Studies    Vital Signs-Reviewed the patient's vital signs. Patient Vitals for the past 12 hrs:   Temp Pulse Resp BP SpO2   04/24/18 1850 97.8 °F (36.6 °C) (!) 46 16 (!) 124/104 100 %       Pulse Oximetry Analysis - Normal 100% on RA      Provider Notes (Medical Decision Making):   DDx: contusion and minor laceration with possible but unlikely fx or underlying brain injury or bleed. Procedures:  Procedures     ED Course:   7:37 PM Initial assessment performed. Pt and/or pt's family are aware of the plan of care and are in agreement. Diagnosis and Disposition       Discharge Note:  8:35 PM  Tamy Frey's  results have been reviewed with her. She has been counseled regarding her diagnosis, treatment, and plan. She verbally conveys understanding and agreement of the signs, symptoms, diagnosis, treatment and prognosis and additionally agrees to follow up as discussed. She also agrees with the care-plan and conveys that all of her questions have been answered.   I have also provided discharge instructions for her that include: educational information regarding their diagnosis and treatment, and list of reasons why they would want to return to the ED prior to their follow-up appointment, should her condition change. She has been provided with education for proper emergency department utilization. Clinical Impression:    1. Minor head injury, initial encounter    2. Laceration of scalp, initial encounter        PLAN:  1. D/C Home  2. Current Discharge Medication List        3. Follow-up Information     Follow up With Details Comments Contact Info    [facility] EMERGENCY DEPT Go in 1 week For staples removal in 7 days [address] [facility]    Sola Hughes MD Schedule an appointment as soon as possible for a visit in 2 days For primary care follow up [facility]  [phone]      [facility] EMERGENCY DEPT Go to As needed, if symptoms worsen [address] [address] [phone]        _______________________________    Attestations: This note is prepared by Alka Corcoran, acting as Scribe for Yessi Conroy. Jono Steward MD.    Yessi Conroy. Jono Steward MD:  The scribe's documentation has been prepared under my direction and personally reviewed by me in its entirety.   I confirm that the note above accurately reflects all work, treatment, procedures, and medical decision making performed by me.  _______________________________

## 2018-04-24 NOTE — ED TRIAGE NOTES
Per pt's son, pt fell this morning around 0700, he took pt to [facility] in [address] [place] [address], states they advised he bring pt to ED for evaluation but states he had to go to work so he is bringing her here now. Pt has staples to her head, placed by [facility]. Unknown LOC. Denies other injury's. Sepsis Screening completed    (  )Patient meets SIRS criteria. (x  )Patient does not meet SIRS criteria.       SIRS Criteria is achieved when two or more of the following are present   Temperature < 96.8°F (36°C) or > 100.9°F (38.3°C)   Heart Rate > 90 beats per minute   Respiratory Rate > 20 breaths per minute   WBC count > 12,000 or <4,000 or > 10% bands

## 2018-04-25 NOTE — DISCHARGE INSTRUCTIONS
Learning About a Closed Head Injury  What is a closed head injury? A closed head injury happens when your head gets hit hard. The strong force of the blow causes your brain to shake in your skull. This movement can cause the brain to bruise, swell, or tear. Sometimes nerves or blood vessels also get damaged. This can cause bleeding in or around the brain. A concussion is a type of closed head injury. What are the symptoms? If you have a mild concussion, you may have a mild headache or feel \"not quite right. \" These symptoms are common. They usually go away over a few days to 4 weeks. But sometimes after a concussion, you feel like you can't function as well as before the injury. And you have new symptoms. This is called postconcussive syndrome. You may:  · Find it harder to solve problems, think, concentrate, or remember. · Have headaches. · Have changes in your sleep patterns, such as not being able to sleep or sleeping all the time. · Have changes in your personality. · Not be interested in your usual activities. · Feel angry or anxious without a clear reason. · Lose your sense of taste or smell. · Be dizzy, lightheaded, or unsteady. It may be hard to stand or walk. How is a closed head injury treated? Any person who may have a concussion needs to see a doctor. Some people have to stay in the hospital to be watched. Others can go home safely. If you go home, follow your doctor's instructions. He or she will tell you if you need someone to watch you closely for the next 24 hours or longer. Rest is the best treatment. Get plenty of sleep at night. And try to rest during the day. · Avoid activities that are physically or mentally demanding. These include housework, exercise, and schoolwork. And don't play video games, send text messages, or use the computer. You may need to change your school or work schedule to be able to avoid these activities.   · Ask your doctor when it's okay to drive, ride a bike, or operate machinery. · Take an over-the-counter pain medicine, such as acetaminophen (Tylenol), ibuprofen (Advil, Motrin), or naproxen (Aleve). Be safe with medicines. Read and follow all instructions on the label. · Check with your doctor before you use any other medicines for pain. · Do not drink alcohol or use illegal drugs. They can slow recovery. They can also increase your risk of getting a second head injury. Follow-up care is a key part of your treatment and safety. Be sure to make and go to all appointments, and call your doctor if you are having problems. It's also a good idea to know your test results and keep a list of the medicines you take. Where can you learn more? Go to http://carlos-chelsea.info/. Enter E235 in the search box to learn more about \"Learning About a Closed Head Injury. \"  Current as of: October 14, 2016  Content Version: 11.4  © 9967-1593 [employer]. Care instructions adapted under license by [employer] (which disclaims liability or warranty for this information). If you have questions about a medical condition or this instruction, always ask your healthcare professional. [address] any warranty or liability for your use of this information.

## 2018-06-01 ENCOUNTER — APPOINTMENT (OUTPATIENT)
Dept: GENERAL RADIOLOGY | Age: 83
End: 2018-06-01
Attending: INTERNAL MEDICINE
Payer: MEDICARE

## 2018-06-01 ENCOUNTER — HOSPITAL ENCOUNTER (EMERGENCY)
Age: 83
Discharge: HOME | End: 2018-06-01
Attending: INTERNAL MEDICINE
Payer: MEDICARE

## 2018-06-01 VITALS
RESPIRATION RATE: 20 BRPM | HEART RATE: 42 BPM | WEIGHT: 132.5 LBS | BODY MASS INDEX: 23.47 KG/M2 | OXYGEN SATURATION: 97 % | TEMPERATURE: 97.6 F | SYSTOLIC BLOOD PRESSURE: 121 MMHG | DIASTOLIC BLOOD PRESSURE: 79 MMHG

## 2018-06-01 DIAGNOSIS — R42 DIZZINESS: ICD-10-CM

## 2018-06-01 DIAGNOSIS — E87.1 HYPONATREMIA: Primary | ICD-10-CM

## 2018-06-01 LAB
ALBUMIN SERPL-MCNC: 3.2 G/DL (ref 3.4–5)
ALBUMIN/GLOB SERPL: 0.8 {RATIO} (ref 0.8–1.7)
ALP SERPL-CCNC: 73 U/L (ref 45–117)
ALT SERPL-CCNC: 15 U/L (ref 13–56)
ANION GAP SERPL CALC-SCNC: 6 MMOL/L (ref 3–18)
APPEARANCE UR: CLEAR
AST SERPL-CCNC: 15 U/L (ref 15–37)
BACTERIA URNS QL MICRO: NEGATIVE /HPF
BASOPHILS # BLD: 0 K/UL (ref 0–0.06)
BASOPHILS NFR BLD: 1 % (ref 0–2)
BILIRUB SERPL-MCNC: 0.5 MG/DL (ref 0.2–1)
BILIRUB UR QL: NEGATIVE
BUN SERPL-MCNC: 17 MG/DL (ref 7–18)
BUN/CREAT SERPL: 13 (ref 12–20)
CALCIUM SERPL-MCNC: 8.3 MG/DL (ref 8.5–10.1)
CHLORIDE SERPL-SCNC: 96 MMOL/L (ref 100–108)
CK MB CFR SERPL CALC: 29.6 % (ref 0–4)
CK MB SERPL-MCNC: 1.7 NG/ML (ref 5–25)
CK SERPL-CCNC: 24 U/L (ref 26–192)
CO2 SERPL-SCNC: 29 MMOL/L (ref 21–32)
COLOR UR: YELLOW
CREAT SERPL-MCNC: 1.35 MG/DL (ref 0.6–1.3)
DIFFERENTIAL METHOD BLD: NORMAL
EOSINOPHIL # BLD: 0.3 K/UL (ref 0–0.4)
EOSINOPHIL NFR BLD: 5 % (ref 0–5)
EPITH CASTS URNS QL MICRO: NORMAL /LPF (ref 0–5)
ERYTHROCYTE [DISTWIDTH] IN BLOOD BY AUTOMATED COUNT: 13.7 % (ref 11.6–14.5)
GLOBULIN SER CALC-MCNC: 3.8 G/DL (ref 2–4)
GLUCOSE SERPL-MCNC: 174 MG/DL (ref 74–99)
GLUCOSE UR STRIP.AUTO-MCNC: NEGATIVE MG/DL
HCT VFR BLD AUTO: 39 % (ref 35–45)
HGB BLD-MCNC: 13.4 G/DL (ref 12–16)
HGB UR QL STRIP: NEGATIVE
KETONES UR QL STRIP.AUTO: NEGATIVE MG/DL
LEUKOCYTE ESTERASE UR QL STRIP.AUTO: ABNORMAL
LYMPHOCYTES # BLD: 1.1 K/UL (ref 0.9–3.6)
LYMPHOCYTES NFR BLD: 22 % (ref 21–52)
MAGNESIUM SERPL-MCNC: 1.5 MG/DL (ref 1.6–2.6)
MCH RBC QN AUTO: 30.9 PG (ref 24–34)
MCHC RBC AUTO-ENTMCNC: 34.4 G/DL (ref 31–37)
MCV RBC AUTO: 89.9 FL (ref 74–97)
MONOCYTES # BLD: 0.4 K/UL (ref 0.05–1.2)
MONOCYTES NFR BLD: 8 % (ref 3–10)
NEUTS SEG # BLD: 3.2 K/UL (ref 1.8–8)
NEUTS SEG NFR BLD: 64 % (ref 40–73)
NITRITE UR QL STRIP.AUTO: NEGATIVE
PH UR STRIP: 6 [PH] (ref 5–8)
PLATELET # BLD AUTO: 193 K/UL (ref 135–420)
PMV BLD AUTO: 9.8 FL (ref 9.2–11.8)
POTASSIUM SERPL-SCNC: 4.1 MMOL/L (ref 3.5–5.5)
PROT SERPL-MCNC: 7 G/DL (ref 6.4–8.2)
PROT UR STRIP-MCNC: NEGATIVE MG/DL
RBC # BLD AUTO: 4.34 M/UL (ref 4.2–5.3)
RBC #/AREA URNS HPF: NORMAL /HPF (ref 0–5)
SODIUM SERPL-SCNC: 131 MMOL/L (ref 136–145)
SP GR UR REFRACTOMETRY: 1 (ref 1–1.03)
TROPONIN I SERPL-MCNC: <0.02 NG/ML (ref 0–0.06)
UROBILINOGEN UR QL STRIP.AUTO: 0.2 EU/DL (ref 0.2–1)
WBC # BLD AUTO: 5 K/UL (ref 4.6–13.2)
WBC URNS QL MICRO: NORMAL /HPF (ref 0–5)

## 2018-06-01 PROCEDURE — 80053 COMPREHEN METABOLIC PANEL: CPT | Performed by: INTERNAL MEDICINE

## 2018-06-01 PROCEDURE — 99285 EMERGENCY DEPT VISIT HI MDM: CPT

## 2018-06-01 PROCEDURE — 83735 ASSAY OF MAGNESIUM: CPT | Performed by: INTERNAL MEDICINE

## 2018-06-01 PROCEDURE — 71045 X-RAY EXAM CHEST 1 VIEW: CPT

## 2018-06-01 PROCEDURE — 81001 URINALYSIS AUTO W/SCOPE: CPT | Performed by: INTERNAL MEDICINE

## 2018-06-01 PROCEDURE — 82550 ASSAY OF CK (CPK): CPT | Performed by: INTERNAL MEDICINE

## 2018-06-01 PROCEDURE — 93005 ELECTROCARDIOGRAM TRACING: CPT

## 2018-06-01 PROCEDURE — 85025 COMPLETE CBC W/AUTO DIFF WBC: CPT | Performed by: INTERNAL MEDICINE

## 2018-06-01 NOTE — ED TRIAGE NOTES
Patient arrives via [facility] from [facility] with c/o dehydration. Patient was noted to have a HR in 45s. Patient saw PCP yesterday, refused to go to ED. Patient had stated that she \"doesn't feel normal.\" [facility] rep reported patient with intermittent nausea and dizziness. Sepsis Screening completed    (  )Patient meets SIRS criteria. ( x )Patient does not meet SIRS criteria.       SIRS Criteria is achieved when two or more of the following are present   Temperature < 96.8°F (36°C) or > 100.9°F (38.3°C)   Heart Rate > 90 beats per minute   Respiratory Rate > 20 breaths per minute   WBC count > 12,000 or <4,000 or > 10% bands

## 2018-06-01 NOTE — ED PROVIDER NOTES
EMERGENCY DEPARTMENT HISTORY AND PHYSICAL EXAM    Date: 6/1/2018  Patient Name: Deann Jerome    History of Presenting Illness     Chief Complaint   Patient presents with    Dehydration         History Provided By: Patient    Chief Complaint: Dizziness  Duration: 2 weeks  Timing:  Intermittent  Severity: Moderate  Associated Symptoms: syncopal episode    Additional History (Context):   11:05 AM  Deann Jerome is a 80 y.o. female with PMHX HTN, HLD, CA, murmur, osteomalacia presents to the emergency department via [address] from [facility] C/O intermittent dizziness, onset 2 weeks ago. Associated sxs include syncopal episode. Pt is asymptomatic in the ED and denies syncopal episode today. Pt states she was seen here two weeks ago for similar sxs and has had recurrent episodes since then. In ED, pt alert and oriented to person, place, time, and situation. Pt denies n/v/d, weakness, and any other sxs or complaints. PCP: Hair Quintero MD    Current Outpatient Prescriptions   Medication Sig Dispense Refill    dextran 70-hypromellose (ARTIFICIAL TEARS,[id number],) ophthalmic solution Administer  to both eyes as needed.  fluticasone (FLONASE) 50 mcg/actuation nasal spray 2 Sprays by Both Nostrils route daily.  potassium chloride (K-DUR, KLOR-CON) 20 mEq tablet Take  by mouth two (2) times a day.  peg 400-propylene glycol (SYSTANE, PROPYLENE GLYCOL,) 0.4-0.3 % drop as needed.  famotidine (PEPCID) 20 mg tablet Take 20 mg by mouth two (2) times a day.  Aspirin, Buffered 81 mg tab Take  by mouth daily.  carica papaya (PAPAYA ENZYME) chew Take  by mouth daily.  omega-3 fatty acids-vitamin e (FISH OIL) 1,000 mg cap Take 1 Cap by mouth daily.  CALCIUM CARBONATE/VITAMIN D3 (CALTRATE 600 + D PO) Take  by mouth daily.  BIOTIN PO Take 1,000 mcg by mouth daily.  flaxseed oil 1,000 mg cap Take  by mouth daily.       ascorbic acid (VITAMIN C) 500 mg tablet Take 1,000 mg by mouth daily.  FEVERFEW PO Take  by mouth daily.  Cholecalciferol, Vitamin D3, 1,000 unit cap Take  by mouth daily.  cinnamon bark (CINNAMON) 500 mg cap Take 1,000 mg by mouth daily. Past History     Past Medical History:  Past Medical History:   Diagnosis Date    Arthritis     Cancer ([address]     right breast    Cardiac murmur     Fall     GERD (gastroesophageal reflux disease)     GERD (gastroesophageal reflux disease)     Gout     Hyperlipidemia     Hyperlipidemia     Hypertension     Osteomalacia     Risk for falls        Past Surgical History:  Past Surgical History:   Procedure Laterality Date    HX APPENDECTOMY  1943    NEUROLOGICAL PROCEDURE UNLISTED  2010    lower back surgery       Family History:  History reviewed. No pertinent family history. Social History:  Social History   Substance Use Topics    Smoking status: Never Smoker    Smokeless tobacco: Never Used    Alcohol use No       Allergies: Allergies   Allergen Reactions    Advil [Ibuprofen] Other (comments)     Upset stomach         Review of Systems   Review of Systems   Gastrointestinal: Negative for diarrhea, nausea and vomiting. Neurological: Positive for dizziness (resolved) and syncope. Negative for weakness. All other systems reviewed and are negative. Physical Exam     Vitals:    06/01/18 1227 06/01/18 1230 06/01/18 1232 06/01/18 1307   BP: 146/44 144/60 160/42 121/79   Pulse: (!) 42 (!) 43 (!) 43 (!) 42   Resp: 20 20 20 20   Temp:       SpO2: 96% 97% 98% 97%   Weight:         Physical Exam   Constitutional: She is oriented to person, place, and time. She appears well-developed and well-nourished. HENT:   Head: Normocephalic and atraumatic. Right Ear: Tympanic membrane and external ear normal.   Left Ear: Tympanic membrane and external ear normal.   Nose: Nose normal.   Mouth/Throat: Oropharynx is clear and moist. Mucous membranes are dry. Hearing aids bilaterally.     Eyes: Conjunctivae and EOM are normal. Pupils are equal, round, and reactive to light. Right eye exhibits no discharge. Left eye exhibits no discharge. No scleral icterus. Neck: Normal range of motion. Neck supple. No JVD present. No tracheal deviation present. Cardiovascular: Regular rhythm, normal heart sounds and intact distal pulses. Bradycardia present. Pulmonary/Chest: Effort normal and breath sounds normal.   Pacer pads on chest.    Abdominal: Soft. Bowel sounds are normal. She exhibits no distension. There is no tenderness. No HSM   Musculoskeletal: Normal range of motion. Neurological: She is alert and oriented to person, place, and time. She has normal reflexes. No cranial nerve deficit. She exhibits normal muscle tone. Coordination normal.   No focal motor weakness. Skin: Skin is warm and dry. No rash noted. Psychiatric: She has a normal mood and affect. Her behavior is normal.   Nursing note and vitals reviewed.         Diagnostic Study Results     Labs -     Recent Results (from the past 12 hour(s))   EKG, 12 LEAD, INITIAL    Collection Time: 06/01/18 10:37 AM   Result Value Ref Range    Ventricular Rate 43 BPM    Atrial Rate 43 BPM    P-R Interval 416 ms    QRS Duration 148 ms    Q-T Interval 578 ms    QTC Calculation (Bezet) 488 ms    Calculated P Axis 58 degrees    Calculated R Axis -58 degrees    Calculated T Axis 168 degrees    Diagnosis       Marked sinus bradycardia with 1st degree AV block  Left axis deviation  Left bundle branch block  Abnormal ECG  When compared with ECG of 21-MAR-2018 14:25,  MD interval has increased  T wave inversion now evident in Inferior leads  T wave inversion more evident in Lateral leads     CBC WITH AUTOMATED DIFF    Collection Time: 06/01/18 10:45 AM   Result Value Ref Range    WBC 5.0 4.6 - 13.2 K/uL    RBC 4.34 4.20 - 5.30 M/uL    HGB 13.4 12.0 - 16.0 g/dL    HCT 39.0 35.0 - 45.0 %    MCV 89.9 74.0 - 97.0 FL    MCH 30.9 24.0 - 34.0 PG    MCHC 34.4 31.0 - 37.0 g/dL RDW 13.7 11.6 - 14.5 %    PLATELET 206 425 - 025 K/uL    MPV 9.8 9.2 - 11.8 FL    NEUTROPHILS 64 40 - 73 %    LYMPHOCYTES 22 21 - 52 %    MONOCYTES 8 3 - 10 %    EOSINOPHILS 5 0 - 5 %    BASOPHILS 1 0 - 2 %    ABS. NEUTROPHILS 3.2 1.8 - 8.0 K/UL    ABS. LYMPHOCYTES 1.1 0.9 - 3.6 K/UL    ABS. MONOCYTES 0.4 0.05 - 1.2 K/UL    ABS. EOSINOPHILS 0.3 0.0 - 0.4 K/UL    ABS. BASOPHILS 0.0 0.0 - 0.06 K/UL    DF AUTOMATED     METABOLIC PANEL, COMPREHENSIVE    Collection Time: 06/01/18 10:45 AM   Result Value Ref Range    Sodium 131 (L) 136 - 145 mmol/L    Potassium 4.1 3.5 - 5.5 mmol/L    Chloride 96 (L) 100 - 108 mmol/L    CO2 29 21 - 32 mmol/L    Anion gap 6 3.0 - 18 mmol/L    Glucose 174 (H) 74 - 99 mg/dL    BUN 17 7.0 - 18 MG/DL    Creatinine 1.35 (H) 0.6 - 1.3 MG/DL    BUN/Creatinine ratio 13 12 - 20      GFR est AA 44 (L) >60 ml/min/1.73m2    GFR est non-AA 37 (L) >60 ml/min/1.73m2    Calcium 8.3 (L) 8.5 - 10.1 MG/DL    Bilirubin, total 0.5 0.2 - 1.0 MG/DL    ALT (SGPT) 15 13 - 56 U/L    AST (SGOT) 15 15 - 37 U/L    Alk.  phosphatase 73 45 - 117 U/L    Protein, total 7.0 6.4 - 8.2 g/dL    Albumin 3.2 (L) 3.4 - 5.0 g/dL    Globulin 3.8 2.0 - 4.0 g/dL    A-G Ratio 0.8 0.8 - 1.7     MAGNESIUM    Collection Time: 06/01/18 10:45 AM   Result Value Ref Range    Magnesium 1.5 (L) 1.6 - 2.6 mg/dL   CARDIAC PANEL,(CK, CKMB & TROPONIN)    Collection Time: 06/01/18 10:45 AM   Result Value Ref Range    CK 24 (L) 26 - 192 U/L    CK - MB 1.7 <3.6 ng/ml    CK-MB Index 29.6 (H) 0.0 - 4.0 %    Troponin-I, Qt. <0.02 0.00 - 0.06 NG/ML   URINALYSIS W/ RFLX MICROSCOPIC    Collection Time: 06/01/18 11:38 AM   Result Value Ref Range    Color YELLOW      Appearance CLEAR      Specific gravity 1.005 1.005 - 1.030      pH (UA) 6.0 5.0 - 8.0      Protein NEGATIVE  NEG mg/dL    Glucose NEGATIVE  NEG mg/dL    Ketone NEGATIVE  NEG mg/dL    Bilirubin NEGATIVE  NEG      Blood NEGATIVE  NEG      Urobilinogen 0.2 0.2 - 1.0 EU/dL    Nitrites NEGATIVE  NEG Leukocyte Esterase TRACE (A) NEG     URINE MICROSCOPIC ONLY    Collection Time: 06/01/18 11:38 AM   Result Value Ref Range    WBC 0 to 3 0 - 5 /hpf    RBC 0 to 3 0 - 5 /hpf    Epithelial cells FEW 0 - 5 /lpf    Bacteria NEGATIVE  NEG /hpf       Radiologic Studies -   XR CHEST PORT   Final Result        CT Results  (Last 48 hours)    None        CXR Results  (Last 48 hours)               06/01/18 1201  XR CHEST PORT Final result    Impression:  Impression:       Hypoinflation, otherwise unremarkable. Narrative:  A portable AP radiograph of the chest was obtained at 1154 hours:   INDICATION:  Dizziness, shortness of breath. COMPARISON:  5/6/2009. FINDINGS:        Heart and mediastinum: Unremarkable. Lungs and pleura: Clear without consolidation or pleural effusion. Lungs are   hypoinflated. Cardiac pads overlie the right hemithorax. Aorta: Mildly tortuous and partially calcified. Bones: Degenerative changes are seen throughout the spine. Other: None. MEDICATIONS GIVEN:  Medications - No data to display     Medical Decision Making   I am the first provider for this patient. I reviewed the vital signs, available nursing notes, past medical history, past surgical history, family history and social history. Vital Signs-Reviewed the patient's vital signs. Pulse Oximetry Analysis - 98% on RA     Cardiac Monitor:  Rate: 42 bpm  Rhythm: sinus bradycardia    EKG interpretation: (Preliminary)  10:37 AM   43 bpm, marked sinus bradycardia with 1st degree AV block (new), left axis deviation, LBBB. ST changes similar to prior EKG on 3/2018  EKG read by Issac Holder MD at 10:45 AM     Records Reviewed: Nursing Notes    Provider Notes (Medical Decision Making):   DDX: Dehydration, UTI, electrolyte disorder, ACS, conduction abnormality, medication effect    Procedures:  Procedures    ED Course:   11:05 AM Initial assessment performed.  The patients presenting problems have been discussed, and they are in agreement with the care plan formulated and outlined with them. I have encouraged them to ask questions as they arise throughout their visit. 1:41 PM Discussed results with the pt and need for follow up with her PCP. I instructed her to stop taking her Lasix. Pt understands and agrees with this plan. She is ready for discharge. Diagnosis and Disposition       DISCHARGE NOTE:  1:41 PM  Tamy Frey's  results have been reviewed with her. She has been counseled regarding her diagnosis, treatment, and plan. She verbally conveys understanding and agreement of the signs, symptoms, diagnosis, treatment and prognosis and additionally agrees to follow up as discussed. She also agrees with the care-plan and conveys that all of her questions have been answered. I have also provided discharge instructions for her that include: educational information regarding their diagnosis and treatment, and list of reasons why they would want to return to the ED prior to their follow-up appointment, should her condition change. She has been provided with education for proper emergency department utilization. CLINICAL IMPRESSION:    1. Hyponatremia    2. Dizziness        PLAN:  1. D/C Home  2. Current Discharge Medication List      STOP taking these medications       furosemide (LASIX) 20 mg tablet Comments:   Reason for Stopping:             3.   Follow-up Information     Follow up With Details Comments Contact Info    Alyssa Wilkins MD Schedule an appointment as soon as possible for a visit in 2 days For recheck sodium level.  [address] [facility] [id number]  [phone]      [facility]  As needed, if symptoms worsen [address] [place] [address]  [phone]          _______________________________   Attestations:     SCRIBE ATTESTATION:  This note is prepared by Alicia Mena, acting as Scribe for Patricia Lopez MD.    PROVIDER ATTESTATION:  Patricia Lopez MD: The scribe's documentation has been prepared under my direction and personally reviewed by me in its entirety.  I confirm that the note above accurately reflects all work, treatment, procedures, and medical decision making performed by me.   _______________________________

## 2018-06-01 NOTE — ED NOTES
Ambulated patient with patient's walker at bedside. Witnessed by ED Delvis Hinton MD's scribe at nursing station. Patient reports slight dizziness, but otherwise capable of ambulating without writer's assistance or needing to sit down.      Elizabeth Clarke MD made aware

## 2018-06-01 NOTE — DISCHARGE INSTRUCTIONS
Hyponatremia: Care Instructions  Your Care Instructions    Hyponatremia (say \"gt-[id number]-TREE-tigre-uh\") means that you don't have enough sodium in your blood. It can cause nausea, vomiting, and headaches. Or you may not feel hungry. In serious cases, it can cause seizures, a coma, or even death. Hyponatremia is not a disease. It is a problem caused by something else, such as medicines or exercising for a long time in hot weather. You can get hyponatremia if you lose a lot of fluids and then you drink a lot of water or other liquids that don't have much sodium. You can also get it if you have kidney, liver, heart, or other health problems. Treatment is focused on getting your sodium levels back to normal.  Follow-up care is a key part of your treatment and safety. Be sure to make and go to all appointments, and call your doctor if you are having problems. It's also a good idea to know your test results and keep a list of the medicines you take. How can you care for yourself at home? · If your doctor recommends it, drink fluids that have sodium. Sports drinks are a good choice. Or you can eat salty foods. · If your doctor recommends it, limit the amount of water you drink. And limit fluids that are mostly water. These include tea, coffee, and juice. · Take your medicines exactly as prescribed. Call your doctor if you have any problems with your medicine. · Get your sodium levels tested when your doctor tells you to. When should you call for help? Call 911 anytime you think you may need emergency care. For example, call if:  ? · You have a seizure. ? · You passed out (lost consciousness). ?Call your doctor now or seek immediate medical care if:  ? · You are confused or it is hard to focus. ? · You have little or no appetite. ? · You feel sick to your stomach or you vomit. ? · You have a headache. ? · You have mood changes. ? · You feel more tired than usual.   ? Watch closely for changes in your health, and be sure to contact your doctor if:  ? · You do not get better as expected. Where can you learn more? Go to http://carlos-chelsea.info/. Enter Y871 in the search box to learn more about \"Hyponatremia: Care Instructions. \"  Current as of: October 14, 2016  Content Version: 11.4  © 7339-9898 [employer]. Care instructions adapted under license by [employer] (which disclaims liability or warranty for this information). If you have questions about a medical condition or this instruction, always ask your healthcare professional. [address] any warranty or liability for your use of this information. Dizziness: Care Instructions  Your Care Instructions  Dizziness is the feeling of unsteadiness or fuzziness in your head. It is different than having vertigo, which is a feeling that the room is spinning or that you are moving or falling. It is also different from lightheadedness, which is the feeling that you are about to faint. It can be hard to know what causes dizziness. Some people feel dizzy when they have migraine headaches. Sometimes bouts of flu can make you feel dizzy. Some medical conditions, such as heart problems or high blood pressure, can make you feel dizzy. Many medicines can cause dizziness, including medicines for high blood pressure, pain, or anxiety. If a medicine causes your symptoms, your doctor may recommend that you stop or change the medicine. If it is a problem with your heart, you may need medicine to help your heart work better. If there is no clear reason for your symptoms, your doctor may suggest watching and waiting for a while to see if the dizziness goes away on its own. Follow-up care is a key part of your treatment and safety. Be sure to make and go to all appointments, and call your doctor if you are having problems.  It's also a good idea to know your test results and keep a list of the medicines you take. How can you care for yourself at home? · If your doctor recommends or prescribes medicine, take it exactly as directed. Call your doctor if you think you are having a problem with your medicine. · Do not drive while you feel dizzy. · Try to prevent falls. Steps you can take include:  ¨ Using nonskid mats, adding grab bars near the tub, and using night-lights. ¨ Clearing your home so that walkways are free of anything you might trip on. ¨ Letting family and friends know that you have been feeling dizzy. This will help them know how to help you. When should you call for help? Call 911 anytime you think you may need emergency care. For example, call if:  ? · You passed out (lost consciousness). ? · You have dizziness along with symptoms of a heart attack. These may include:  ¨ Chest pain or pressure, or a strange feeling in the chest.  ¨ Sweating. ¨ Shortness of breath. ¨ Nausea or vomiting. ¨ Pain, pressure, or a strange feeling in the back, neck, jaw, or upper belly or in one or both shoulders or arms. ¨ Lightheadedness or sudden weakness. ¨ A fast or irregular heartbeat. ? · You have symptoms of a stroke. These may include:  ¨ Sudden numbness, tingling, weakness, or loss of movement in your face, arm, or leg, especially on only one side of your body. ¨ Sudden vision changes. ¨ Sudden trouble speaking. ¨ Sudden confusion or trouble understanding simple statements. ¨ Sudden problems with walking or balance. ¨ A sudden, severe headache that is different from past headaches. ?Call your doctor now or seek immediate medical care if:  ? · You feel dizzy and have a fever, headache, or ringing in your ears. ? · You have new or increased nausea and vomiting. ? · Your dizziness does not go away or comes back. ? Watch closely for changes in your health, and be sure to contact your doctor if:  ? · You do not get better as expected. Where can you learn more?   Go to http://carlos-chelsea.info/. Enter H459 in the search box to learn more about \"Dizziness: Care Instructions. \"  Current as of: March 20, 2017  Content Version: 11.4  © 9151-4068 [employer]. Care instructions adapted under license by [employer] (which disclaims liability or warranty for this information). If you have questions about a medical condition or this instruction, always ask your healthcare professional. [place] [address] any warranty or liability for your use of this information.

## 2018-06-01 NOTE — ED NOTES
TRANSFER - OUT REPORT:    Verbal report given to JAZMINE Terry(name) on Bhavya Leyva  being transferred to [employer] [facility]     Report consisted of patients Situation, Background, Assessment and   Recommendations(SBAR). Information from the following report(s) SBAR, Kardex, ED Summary and Recent Results was reviewed with the receiving nurse. Opportunity for questions and clarification was provided. Caro Araujo, recommended for patient's son to be called for transportation back to [facility].

## 2018-06-09 LAB
ATRIAL RATE: 43 BPM
CALCULATED P AXIS, ECG09: 58 DEGREES
CALCULATED R AXIS, ECG10: -58 DEGREES
CALCULATED T AXIS, ECG11: 168 DEGREES
DIAGNOSIS, 93000: NORMAL
P-R INTERVAL, ECG05: 416 MS
Q-T INTERVAL, ECG07: 578 MS
QRS DURATION, ECG06: 148 MS
QTC CALCULATION (BEZET), ECG08: 488 MS
VENTRICULAR RATE, ECG03: 43 BPM

## 2018-06-14 ENCOUNTER — HOSPITAL ENCOUNTER (INPATIENT)
Age: 83
LOS: 4 days | Discharge: NURSING, REHAB OR LONG-TERM CARE | DRG: 243 | End: 2018-06-18
Attending: INTERNAL MEDICINE | Admitting: HOSPITALIST
Payer: MEDICARE

## 2018-06-14 ENCOUNTER — APPOINTMENT (OUTPATIENT)
Dept: GENERAL RADIOLOGY | Age: 83
DRG: 243 | End: 2018-06-14
Attending: INTERNAL MEDICINE
Payer: MEDICARE

## 2018-06-14 DIAGNOSIS — I44.2 THIRD DEGREE AV BLOCK (HCC): Primary | ICD-10-CM

## 2018-06-14 DIAGNOSIS — I10 ESSENTIAL HYPERTENSION: ICD-10-CM

## 2018-06-14 DIAGNOSIS — R06.00 DYSPNEA, UNSPECIFIED TYPE: ICD-10-CM

## 2018-06-14 PROBLEM — E78.5 HYPERLIPIDEMIA: Status: ACTIVE | Noted: 2018-06-14

## 2018-06-14 PROBLEM — E87.5 HYPERKALEMIA: Status: ACTIVE | Noted: 2018-06-14

## 2018-06-14 PROBLEM — R01.1 CARDIAC MURMUR: Status: ACTIVE | Noted: 2018-06-14

## 2018-06-14 PROBLEM — N18.30 CKD (CHRONIC KIDNEY DISEASE) STAGE 3, GFR 30-59 ML/MIN (HCC): Status: ACTIVE | Noted: 2018-06-14

## 2018-06-14 PROBLEM — Z91.81 RISK FOR FALLS: Status: ACTIVE | Noted: 2018-06-14

## 2018-06-14 PROBLEM — K21.9 GERD (GASTROESOPHAGEAL REFLUX DISEASE): Status: ACTIVE | Noted: 2018-06-14

## 2018-06-14 LAB
ALBUMIN SERPL-MCNC: 3 G/DL (ref 3.4–5)
ALBUMIN/GLOB SERPL: 0.9 {RATIO} (ref 0.8–1.7)
ALP SERPL-CCNC: 66 U/L (ref 45–117)
ALT SERPL-CCNC: 12 U/L (ref 13–56)
ANION GAP BLD CALC-SCNC: 14 MMOL/L (ref 10–20)
ANION GAP SERPL CALC-SCNC: 7 MMOL/L (ref 3–18)
APTT PPP: 30.3 SEC (ref 23–36.4)
AST SERPL-CCNC: 14 U/L (ref 15–37)
ATRIAL RATE: 90 BPM
BASOPHILS # BLD: 0 K/UL (ref 0–0.06)
BASOPHILS NFR BLD: 0 % (ref 0–2)
BILIRUB SERPL-MCNC: 0.6 MG/DL (ref 0.2–1)
BNP SERPL-MCNC: 8391 PG/ML (ref 0–1800)
BUN BLD-MCNC: 29 MG/DL (ref 7–18)
BUN SERPL-MCNC: 23 MG/DL (ref 7–18)
BUN/CREAT SERPL: 15 (ref 12–20)
CA-I BLD-MCNC: 1.21 MMOL/L (ref 1.12–1.32)
CALCIUM SERPL-MCNC: 8.3 MG/DL (ref 8.5–10.1)
CALCULATED P AXIS, ECG09: 68 DEGREES
CALCULATED R AXIS, ECG10: -49 DEGREES
CALCULATED T AXIS, ECG11: 102 DEGREES
CHLORIDE BLD-SCNC: 102 MMOL/L (ref 100–108)
CHLORIDE SERPL-SCNC: 103 MMOL/L (ref 100–108)
CK MB CFR SERPL CALC: NORMAL % (ref 0–4)
CK MB SERPL-MCNC: <1 NG/ML (ref 5–25)
CK SERPL-CCNC: 26 U/L (ref 26–192)
CO2 BLD-SCNC: 25 MMOL/L (ref 19–24)
CO2 SERPL-SCNC: 25 MMOL/L (ref 21–32)
CREAT SERPL-MCNC: 1.49 MG/DL (ref 0.6–1.3)
CREAT UR-MCNC: 1.4 MG/DL (ref 0.6–1.3)
DIAGNOSIS, 93000: NORMAL
DIFFERENTIAL METHOD BLD: ABNORMAL
EOSINOPHIL # BLD: 0.2 K/UL (ref 0–0.4)
EOSINOPHIL NFR BLD: 3 % (ref 0–5)
ERYTHROCYTE [DISTWIDTH] IN BLOOD BY AUTOMATED COUNT: 14.7 % (ref 11.6–14.5)
GLOBULIN SER CALC-MCNC: 3.5 G/DL (ref 2–4)
GLUCOSE BLD STRIP.AUTO-MCNC: 94 MG/DL (ref 74–106)
GLUCOSE SERPL-MCNC: 97 MG/DL (ref 74–99)
HCT VFR BLD AUTO: 36.3 % (ref 35–45)
HCT VFR BLD CALC: 33 % (ref 36–49)
HGB BLD-MCNC: 11.2 G/DL (ref 12–16)
HGB BLD-MCNC: 12.2 G/DL (ref 12–16)
INR PPP: 1.1 (ref 0.8–1.2)
LIPASE SERPL-CCNC: 73 U/L (ref 73–393)
LYMPHOCYTES # BLD: 1.3 K/UL (ref 0.9–3.6)
LYMPHOCYTES NFR BLD: 22 % (ref 21–52)
MAGNESIUM SERPL-MCNC: 2.2 MG/DL (ref 1.6–2.6)
MCH RBC QN AUTO: 31.1 PG (ref 24–34)
MCHC RBC AUTO-ENTMCNC: 33.6 G/DL (ref 31–37)
MCV RBC AUTO: 92.6 FL (ref 74–97)
MONOCYTES # BLD: 0.6 K/UL (ref 0.05–1.2)
MONOCYTES NFR BLD: 11 % (ref 3–10)
NEUTS SEG # BLD: 3.8 K/UL (ref 1.8–8)
NEUTS SEG NFR BLD: 64 % (ref 40–73)
PLATELET # BLD AUTO: 217 K/UL (ref 135–420)
PMV BLD AUTO: 10.9 FL (ref 9.2–11.8)
POTASSIUM BLD-SCNC: 5.8 MMOL/L (ref 3.5–5.5)
POTASSIUM SERPL-SCNC: 5.7 MMOL/L (ref 3.5–5.5)
PROT SERPL-MCNC: 6.5 G/DL (ref 6.4–8.2)
PROTHROMBIN TIME: 13.6 SEC (ref 11.5–15.2)
Q-T INTERVAL, ECG07: 544 MS
QRS DURATION, ECG06: 140 MS
QTC CALCULATION (BEZET), ECG08: 397 MS
RBC # BLD AUTO: 3.92 M/UL (ref 4.2–5.3)
SODIUM BLD-SCNC: 134 MMOL/L (ref 136–145)
SODIUM SERPL-SCNC: 135 MMOL/L (ref 136–145)
TROPONIN I SERPL-MCNC: <0.02 NG/ML (ref 0–0.06)
VENTRICULAR RATE, ECG03: 32 BPM
WBC # BLD AUTO: 5.9 K/UL (ref 4.6–13.2)

## 2018-06-14 PROCEDURE — 65610000006 HC RM INTENSIVE CARE

## 2018-06-14 PROCEDURE — 93005 ELECTROCARDIOGRAM TRACING: CPT

## 2018-06-14 PROCEDURE — 85610 PROTHROMBIN TIME: CPT | Performed by: INTERNAL MEDICINE

## 2018-06-14 PROCEDURE — 77030011640 HC PAD GRND REM COVD -A

## 2018-06-14 PROCEDURE — 83690 ASSAY OF LIPASE: CPT | Performed by: INTERNAL MEDICINE

## 2018-06-14 PROCEDURE — 5A1223Z PERFORMANCE OF CARDIAC PACING, CONTINUOUS: ICD-10-PCS | Performed by: SURGERY

## 2018-06-14 PROCEDURE — 83880 ASSAY OF NATRIURETIC PEPTIDE: CPT | Performed by: INTERNAL MEDICINE

## 2018-06-14 PROCEDURE — C1751 CATH, INF, PER/CENT/MIDLINE: HCPCS

## 2018-06-14 PROCEDURE — 71045 X-RAY EXAM CHEST 1 VIEW: CPT

## 2018-06-14 PROCEDURE — 33210 INSERT ELECTRD/PM CATH SNGL: CPT

## 2018-06-14 PROCEDURE — 77030020177 HC ELECTRD DEFIB PD PHIL -B

## 2018-06-14 PROCEDURE — 74011000250 HC RX REV CODE- 250

## 2018-06-14 PROCEDURE — 74011000250 HC RX REV CODE- 250: Performed by: HOSPITALIST

## 2018-06-14 PROCEDURE — 99285 EMERGENCY DEPT VISIT HI MDM: CPT

## 2018-06-14 PROCEDURE — 85025 COMPLETE CBC W/AUTO DIFF WBC: CPT | Performed by: INTERNAL MEDICINE

## 2018-06-14 PROCEDURE — 77030002996 HC SUT SLK J&J -A

## 2018-06-14 PROCEDURE — 74011250636 HC RX REV CODE- 250/636: Performed by: INTERNAL MEDICINE

## 2018-06-14 PROCEDURE — 82550 ASSAY OF CK (CPK): CPT | Performed by: INTERNAL MEDICINE

## 2018-06-14 PROCEDURE — 85730 THROMBOPLASTIN TIME PARTIAL: CPT | Performed by: INTERNAL MEDICINE

## 2018-06-14 PROCEDURE — 83735 ASSAY OF MAGNESIUM: CPT | Performed by: INTERNAL MEDICINE

## 2018-06-14 PROCEDURE — 80053 COMPREHEN METABOLIC PANEL: CPT | Performed by: INTERNAL MEDICINE

## 2018-06-14 PROCEDURE — 74011250636 HC RX REV CODE- 250/636

## 2018-06-14 PROCEDURE — C1769 GUIDE WIRE: HCPCS

## 2018-06-14 PROCEDURE — 80047 BASIC METABLC PNL IONIZED CA: CPT

## 2018-06-14 RX ORDER — ASCORBIC ACID 250 MG
1000 TABLET ORAL DAILY
Status: DISCONTINUED | OUTPATIENT
Start: 2018-06-15 | End: 2018-06-18 | Stop reason: HOSPADM

## 2018-06-14 RX ORDER — FENTANYL CITRATE 50 UG/ML
25-100 INJECTION, SOLUTION INTRAMUSCULAR; INTRAVENOUS
Status: DISCONTINUED | OUTPATIENT
Start: 2018-06-14 | End: 2018-06-14 | Stop reason: HOSPADM

## 2018-06-14 RX ORDER — HEPARIN SODIUM 5000 [USP'U]/ML
5000 INJECTION, SOLUTION INTRAVENOUS; SUBCUTANEOUS EVERY 8 HOURS
Status: DISCONTINUED | OUTPATIENT
Start: 2018-06-14 | End: 2018-06-14

## 2018-06-14 RX ORDER — MIDAZOLAM HYDROCHLORIDE 1 MG/ML
INJECTION, SOLUTION INTRAMUSCULAR; INTRAVENOUS
Status: DISPENSED
Start: 2018-06-14 | End: 2018-06-15

## 2018-06-14 RX ORDER — HEPARIN SODIUM 200 [USP'U]/100ML
500 INJECTION, SOLUTION INTRAVENOUS
Status: DISCONTINUED | OUTPATIENT
Start: 2018-06-14 | End: 2018-06-14 | Stop reason: HOSPADM

## 2018-06-14 RX ORDER — FENTANYL CITRATE 50 UG/ML
INJECTION, SOLUTION INTRAMUSCULAR; INTRAVENOUS
Status: DISPENSED
Start: 2018-06-14 | End: 2018-06-15

## 2018-06-14 RX ORDER — LIDOCAINE HYDROCHLORIDE 10 MG/ML
INJECTION INFILTRATION; PERINEURAL
Status: COMPLETED
Start: 2018-06-14 | End: 2018-06-14

## 2018-06-14 RX ORDER — LIDOCAINE HYDROCHLORIDE 10 MG/ML
10-30 INJECTION INFILTRATION; PERINEURAL
Status: DISCONTINUED | OUTPATIENT
Start: 2018-06-14 | End: 2018-06-14 | Stop reason: HOSPADM

## 2018-06-14 RX ORDER — SODIUM CHLORIDE 0.9 % (FLUSH) 0.9 %
5-10 SYRINGE (ML) INJECTION EVERY 8 HOURS
Status: DISCONTINUED | OUTPATIENT
Start: 2018-06-14 | End: 2018-06-18 | Stop reason: SDUPTHER

## 2018-06-14 RX ORDER — POLYVINYL ALCOHOL 14 MG/ML
1 SOLUTION/ DROPS OPHTHALMIC AS NEEDED
Status: DISCONTINUED | OUTPATIENT
Start: 2018-06-14 | End: 2018-06-14 | Stop reason: ALTCHOICE

## 2018-06-14 RX ORDER — CARBOXYMETHYLCELLULOSE SODIUM 5 MG/ML
1 SOLUTION/ DROPS OPHTHALMIC AS NEEDED
Status: DISCONTINUED | OUTPATIENT
Start: 2018-06-14 | End: 2018-06-18 | Stop reason: HOSPADM

## 2018-06-14 RX ORDER — HYDRALAZINE HYDROCHLORIDE 20 MG/ML
20 INJECTION INTRAMUSCULAR; INTRAVENOUS
Status: DISCONTINUED | OUTPATIENT
Start: 2018-06-14 | End: 2018-06-18 | Stop reason: HOSPADM

## 2018-06-14 RX ORDER — HEPARIN SODIUM 200 [USP'U]/100ML
INJECTION, SOLUTION INTRAVENOUS
Status: COMPLETED
Start: 2018-06-14 | End: 2018-06-14

## 2018-06-14 RX ORDER — FLUTICASONE PROPIONATE 50 MCG
2 SPRAY, SUSPENSION (ML) NASAL DAILY
Status: DISCONTINUED | OUTPATIENT
Start: 2018-06-15 | End: 2018-06-18 | Stop reason: HOSPADM

## 2018-06-14 RX ORDER — SODIUM CHLORIDE 0.9 % (FLUSH) 0.9 %
5-10 SYRINGE (ML) INJECTION EVERY 8 HOURS
Status: DISCONTINUED | OUTPATIENT
Start: 2018-06-14 | End: 2018-06-15 | Stop reason: HOSPADM

## 2018-06-14 RX ORDER — SODIUM CHLORIDE 0.9 % (FLUSH) 0.9 %
5-10 SYRINGE (ML) INJECTION AS NEEDED
Status: DISCONTINUED | OUTPATIENT
Start: 2018-06-14 | End: 2018-06-15 | Stop reason: HOSPADM

## 2018-06-14 RX ORDER — SODIUM CHLORIDE 0.9 % (FLUSH) 0.9 %
5-10 SYRINGE (ML) INJECTION AS NEEDED
Status: DISCONTINUED | OUTPATIENT
Start: 2018-06-14 | End: 2018-06-18 | Stop reason: SDUPTHER

## 2018-06-14 RX ORDER — LIDOCAINE HYDROCHLORIDE 10 MG/ML
INJECTION INFILTRATION; PERINEURAL
Status: DISPENSED
Start: 2018-06-14 | End: 2018-06-15

## 2018-06-14 RX ORDER — MIDAZOLAM HYDROCHLORIDE 1 MG/ML
.5-2 INJECTION, SOLUTION INTRAMUSCULAR; INTRAVENOUS
Status: DISCONTINUED | OUTPATIENT
Start: 2018-06-14 | End: 2018-06-14 | Stop reason: HOSPADM

## 2018-06-14 RX ADMIN — Medication 10 ML: at 22:28

## 2018-06-14 RX ADMIN — LIDOCAINE HYDROCHLORIDE 20 ML: 10 INJECTION INFILTRATION; PERINEURAL at 18:58

## 2018-06-14 RX ADMIN — HEPARIN SODIUM 1000 UNITS: 200 INJECTION, SOLUTION INTRAVENOUS at 18:57

## 2018-06-14 RX ADMIN — LIDOCAINE HYDROCHLORIDE 20 ML: 10 INJECTION, SOLUTION INFILTRATION; PERINEURAL at 18:58

## 2018-06-14 RX ADMIN — HYDRALAZINE HYDROCHLORIDE 20 MG: 20 INJECTION INTRAMUSCULAR; INTRAVENOUS at 22:15

## 2018-06-14 RX ADMIN — FAMOTIDINE 20 MG: 10 INJECTION, SOLUTION INTRAVENOUS at 22:15

## 2018-06-14 RX ADMIN — Medication 1000 UNITS: at 18:57

## 2018-06-14 NOTE — ED TRIAGE NOTES
Patient arrives via EMS from [facility] with c/o shortness of breath noted by the staff. Upon arrival, EMS report patient denies any SOB or CP and arrives on 3L O2. Patient reports that she was unaware that she was coming to the ED.    EMS obtained FSBS: 100 mg/dL  O2sats RA noted on CCM: 97%

## 2018-06-14 NOTE — H&P
History & Physical    Patient: Ruddy Lynn MRN: [id number]  CSN: [id number]    YOB: 1924  Age: 80 y.o. Sex: female      DOA: 6/14/2018  Primary Care Provider:  Fito Alvarez MD      Assessment/Plan     Hospital Problems  Never Reviewed          Codes Class Noted POA    HTN (hypertension) ICD-10-CM: I10  ICD-9-CM: 401.9  6/14/2018 Unknown        Dyspnea ICD-10-CM: R06.00  ICD-9-CM: 786.09  6/14/2018 Unknown        * (Principal)AV block, 3rd degree (HCC) ICD-10-CM: I44.2  ICD-9-CM: 426.0  6/14/2018 Unknown        Cardiac murmur ICD-10-CM: R01.1  ICD-9-CM: 785.2  6/14/2018 Unknown        Hyperlipidemia ICD-10-CM: E78.5  ICD-9-CM: 272.4  6/14/2018 Unknown        Hypertension ICD-10-CM: I10  ICD-9-CM: 401.9  6/14/2018 Unknown        Risk for falls ICD-10-CM: Z91.81  ICD-9-CM: V15.88  6/14/2018 Unknown        GERD (gastroesophageal reflux disease) ICD-10-CM: K21.9  ICD-9-CM: 530.81  6/14/2018 Unknown        Hyperkalemia ICD-10-CM: E87.5  ICD-9-CM: 276.7  6/14/2018 Unknown        CKD (chronic kidney disease) stage 3, GFR 30-59 ml/min ICD-10-CM: N18.3  ICD-9-CM: 585.3  6/14/2018 Unknown              3 degree av block   HTN   Risk of fall   Hyperkalemia   ckd3  Hx of breast cancer   gerd   Admit to ICU     Card   3r degree av block   Dr. Guero Samayoa will put transvenous pacemaker tonight and permeant may be tomorrow   HTN uncontrolled   Will hold anti-HTN medication for now, due to block  HLD: continue home medication   Cardia cm  Will defer cardiology for echo       Respiratory  So far no acute issue   Gi:   gerd ppi     Renal: K 5.7, will continue monitoring, check mg , ckd3, will continue monitor renal function     70  minutes of critical care time spent in the direct evaluation and treatment of this high risk patient.  The reason for providing this level of medical care for this critically ill patient was due a critical illness that impaired one or more vital organ systems such that there was a high probability of imminent or life threatening deterioration in the patients condition. This care involved high complexity decision making to assess, manipulate, and support vital system functions, to treat this degreee vital organ system failure and to prevent further life threatening deterioration of the patients condition. DVT : heparin . ppi proph  CC: leg swelling        HPI:     Allan Klein is a 80 y.o. female who hx of ?dementia, hard hearing, heart murmur. HTN ,breast cancer [facility] due to leg swelling. She is not a good historian. Per ER reported she had recent syncope episode, ekg indicated 1st degree av block. She was found 3rd degree av block in er and elevated BP. She is asymptomatic. Intensive and cardiology were consulted in er and family want to have procedure done. Denies any slurred speech/headache/cp/n/v/blurred vission/d/c/palpitation/gait change/bleeding. Denies smoking/ any alcohol or drug use. Visit Vitals    BP (!) 192/27    Pulse 62    Temp 97.7 °F (36.5 °C)    Resp 18    Ht 5' 4\" (1.626 m)    Wt 65.1 kg (143 lb 9.6 oz)    SpO2 95%    BMI 24.65 kg/m2      O2 Device: Room air      Past Medical History:   Diagnosis Date    Arthritis     Cancer ([address]     right breast    Cardiac murmur     Fall     GERD (gastroesophageal reflux disease)     GERD (gastroesophageal reflux disease)     Gout     Hyperlipidemia     Hyperlipidemia     Hypertension     Osteomalacia     Risk for falls        Past Surgical History:   Procedure Laterality Date    HX APPENDECTOMY  1943    NEUROLOGICAL PROCEDURE UNLISTED  2010    lower back surgery       History reviewed. No pertinent family history.     Social History     Social History    Marital status:      Spouse name: N/A    Number of children: N/A    Years of education: N/A     Social History Main Topics    Smoking status: Never Smoker    Smokeless tobacco: Never Used    Alcohol use No    Drug use: No    Sexual activity: No     Other Topics Concern    None     Social History Narrative       Prior to Admission medications    Medication Sig Start Date End Date Taking? Authorizing Provider   dextran 70-hypromellose (ARTIFICIAL TEARS,[id number],) ophthalmic solution Administer  to both eyes as needed. Sridhar Chase MD   fluticasone (FLONASE) 50 mcg/actuation nasal spray 2 Sprays by Both Nostrils route daily. Sridhar Chase MD   potassium chloride (K-DUR, KLOR-CON) 20 mEq tablet Take  by mouth two (2) times a day. Sridhar Chase MD   peg 400-propylene glycol (SYSTANE, PROPYLENE GLYCOL,) 0.4-0.3 % drop as needed. Sridhar Chase MD   famotidine (PEPCID) 20 mg tablet Take 20 mg by mouth two (2) times a day. Sridhar Chase MD   Aspirin, Buffered 81 mg tab Take  by mouth daily. Historical Provider   carica papaya (PAPAYA ENZYME) chew Take  by mouth daily. Historical Provider   omega-3 fatty acids-vitamin e (FISH OIL) 1,000 mg cap Take 1 Cap by mouth daily. Historical Provider   CALCIUM CARBONATE/VITAMIN D3 (CALTRATE 600 + D PO) Take  by mouth daily. Historical Provider   BIOTIN PO Take 1,000 mcg by mouth daily. Historical Provider   flaxseed oil 1,000 mg cap Take  by mouth daily. Historical Provider   ascorbic acid (VITAMIN C) 500 mg tablet Take 1,000 mg by mouth daily. Historical Provider   FEVERFEW PO Take  by mouth daily. Historical Provider   Cholecalciferol, Vitamin D3, 1,000 unit cap Take  by mouth daily. Historical Provider   cinnamon bark (CINNAMON) 500 mg cap Take 1,000 mg by mouth daily. Historical Provider       Allergies   Allergen Reactions    Advil [Ibuprofen] Other (comments)     Upset stomach       Review of Systems  Gen: No fever, chills, malaise, weight loss/gain. Heent: No headache, rhinorrhea, epistaxis, ear pain, hearing loss, sinus pain, neck pain/stiffness, sore throat. Heart: No chest pain, palpitations, POSADA, pnd, or orthopnea.    Resp: No cough, hemoptysis, wheezing and shortness of breath. GI: No nausea, vomiting, diarrhea, constipation, melena or hematochezia. : No urinary obstruction, dysuria or hematuria. Derm: No rash, new skin lesion or pruritis. Musc/skeletal: no bone or joint complains. Vasc: +edema, no cyanosis or claudication. Endo: No heat/cold intolerance, no polyuria,polydipsia or polyphagia. Neuro: No unilateral weakness, numbness, tingling. No seizures. Syncope-before   Heme: No easy bruising or bleeding. Physical Exam:     Physical Exam:  Visit Vitals    BP (!) 192/27    Pulse 62    Temp 97.7 °F (36.5 °C)    Resp 18    Ht 5' 4\" (1.626 m)    Wt 65.1 kg (143 lb 9.6 oz)    SpO2 95%    BMI 24.65 kg/m2      O2 Device: Room air    Temp (24hrs), Av.7 °F (36.5 °C), Min:97.7 °F (36.5 °C), Max:97.7 °F (36.5 °C)             General:  Awake, cooperative, no distress. Head:  Normocephalic, without obvious abnormality, atraumatic. Eyes:  Conjunctivae/corneas clear, sclera anicteric, PERRL, EOMs intact. Nose: Nares normal. No drainage or sinus tenderness. Throat: Lips, mucosa, and tongue normal. .   Neck: Supple, symmetrical, trachea midline, no adenopathy. Lungs:   Clear to auscultation bilaterally. Heart:  Juan J , S1, S2 normal, +murmur, click, rub or gallop. Abdomen: Soft, non-tender. Bowel sounds normal. No masses,  No organomegaly. Extremities: Extremities normal, atraumatic, no cyanosis or edema. Pulses: 2+ and symmetric all extremities. Skin: Skin color-pink, texture, turgor normal. No rashes or lesions. Capillary refill normal    Neurologic: CNII-XII intact. No focal motor or sensory deficit.        Labs Reviewed:    BMP:   Lab Results   Component Value Date/Time     (L) 2018 05:55 PM    K 5.7 (H) 2018 05:55 PM     2018 05:55 PM    CO2 25 2018 05:55 PM    AGAP 7 2018 05:55 PM    GLU 97 2018 05:55 PM    BUN 23 (H) 2018 05:55 PM    CREA 1.49 (H) 06/14/2018 05:55 PM    GFRAA 40 (L) 06/14/2018 05:55 PM    GFRNA 33 (L) 06/14/2018 05:55 PM     CMP:   Lab Results   Component Value Date/Time     (L) 06/14/2018 05:55 PM    K 5.7 (H) 06/14/2018 05:55 PM     06/14/2018 05:55 PM    CO2 25 06/14/2018 05:55 PM    AGAP 7 06/14/2018 05:55 PM    GLU 97 06/14/2018 05:55 PM    BUN 23 (H) 06/14/2018 05:55 PM    CREA 1.49 (H) 06/14/2018 05:55 PM    GFRAA 40 (L) 06/14/2018 05:55 PM    GFRNA 33 (L) 06/14/2018 05:55 PM    CA 8.3 (L) 06/14/2018 05:55 PM    MG 2.2 06/14/2018 05:55 PM    ALB 3.0 (L) 06/14/2018 05:55 PM    TP 6.5 06/14/2018 05:55 PM    GLOB 3.5 06/14/2018 05:55 PM    AGRAT 0.9 06/14/2018 05:55 PM    SGOT 14 (L) 06/14/2018 05:55 PM    ALT 12 (L) 06/14/2018 05:55 PM     CBC:   Lab Results   Component Value Date/Time    WBC 5.9 06/14/2018 05:03 PM    HGB 12.2 06/14/2018 05:03 PM    HCT 36.3 06/14/2018 05:03 PM     06/14/2018 05:03 PM     All Cardiac Markers in the last 24 hours:   Lab Results   Component Value Date/Time    CPK 26 06/14/2018 05:55 PM    CKMB <1.0 06/14/2018 05:55 PM    CKND1  06/14/2018 05:55 PM     CALCULATION NOT PERFORMED WHEN RESULT IS BELOW LINEAR LIMIT    TROIQ <0.02 06/14/2018 05:55 PM     Recent Glucose Results:   Lab Results   Component Value Date/Time    GLU 97 06/14/2018 05:55 PM     ABG: No results found for: PH, PHI, PCO2, PCO2I, PO2, PO2I, HCO3, HCO3I, FIO2, FIO2I  COAGS:   Lab Results   Component Value Date/Time    APTT 30.3 06/14/2018 05:03 PM    PTP 13.6 06/14/2018 05:03 PM    INR 1.1 06/14/2018 05:03 PM     Liver Panel:   Lab Results   Component Value Date/Time    ALB 3.0 (L) 06/14/2018 05:55 PM    TP 6.5 06/14/2018 05:55 PM    GLOB 3.5 06/14/2018 05:55 PM    AGRAT 0.9 06/14/2018 05:55 PM    SGOT 14 (L) 06/14/2018 05:55 PM    ALT 12 (L) 06/14/2018 05:55 PM    AP 66 06/14/2018 05:55 PM     Pancreatic Markers:   Lab Results   Component Value Date/Time    LPSE 73 06/14/2018 05:55 PM       Procedures/imaging: see electronic medical records for all procedures/Xrays and details which were not copied into this note but were reviewed prior to creation of Tadeo Caraballo MD, Internal Medicine     CC: Elias Dalton MD

## 2018-06-14 NOTE — ED NOTES
Attempted to give brief report to ICU. Antoni Amaya RN told by another RN to inform writer to give report to oncoming night RN.

## 2018-06-14 NOTE — PROCEDURES
Temporary pacemaker placement done via right femoral vein. Position confirmed with Fluro. Pacing out put 5 mA  Set Heart rate 50 bpm   Sensitivity 10 mV  Pacing threshold 0.5    Patient tolerated procedure. Transfer to ICU  Injection IV hydralazine 20 mg every 6 hours when necessary if systolic blood pressure more than 180 mm hg.  Echocardiogram  Patient will need permanent pacemaker, discussed with patient and family member  Patient and family member agreed for permanent pacemaker placement.   Continue management as per hospital medicine

## 2018-06-14 NOTE — ED PROVIDER NOTES
EMERGENCY DEPARTMENT HISTORY AND PHYSICAL EXAM    Date: 6/14/2018  Patient Name: Miguel Alcantar    History of Presenting Illness     Chief Complaint   Patient presents with    Shortness of Breath         History Provided By: Patient and EMS    Chief Complaint: SOB  Duration: 1 Hours  Timing:  Acute  Location: N/A  Associated Symptoms: leg swelling    Additional History (Context):   4:53 PM  Miguel Alcantar is a 80 y.o. female with PMHX of right breast cancer, HTN, GERD, gout and arthritis who presents to the emergency department via EMS from [address] noted by nursing staff onset 1 hour ago. Associated sxs include leg swelling. EMS reports a blood pressure of 200/90 with heart rate at 36 bpm, respiratory rate at 20. EMS reports O2 stats of 93% on RA, which improved to 98% when EMS placed pt on 3 liters/min via. Pt denies chest pain, SOB, and any other sxs or complaints. PCP: Kary Echeverria MD    Current Facility-Administered Medications   Medication Dose Route Frequency Provider Last Rate Last Dose    lidocaine (XYLOCAINE) 10 mg/mL (1 %) injection             midazolam (VERSED) 1 mg/mL injection             fentaNYL citrate (PF) 50 mcg/mL injection             heparinized saline 2 units/mL 1,000 unit/500 mL infusion              Current Outpatient Prescriptions   Medication Sig Dispense Refill    dextran 70-hypromellose (ARTIFICIAL TEARS,[id number],) ophthalmic solution Administer  to both eyes as needed.  fluticasone (FLONASE) 50 mcg/actuation nasal spray 2 Sprays by Both Nostrils route daily.  potassium chloride (K-DUR, KLOR-CON) 20 mEq tablet Take  by mouth two (2) times a day.  peg 400-propylene glycol (SYSTANE, PROPYLENE GLYCOL,) 0.4-0.3 % drop as needed.  famotidine (PEPCID) 20 mg tablet Take 20 mg by mouth two (2) times a day.  Aspirin, Buffered 81 mg tab Take  by mouth daily.  carica papaya (PAPAYA ENZYME) chew Take  by mouth daily.       omega-3 fatty acids-vitamin e (FISH OIL) 1,000 mg cap Take 1 Cap by mouth daily.  CALCIUM CARBONATE/VITAMIN D3 (CALTRATE 600 + D PO) Take  by mouth daily.  BIOTIN PO Take 1,000 mcg by mouth daily.  flaxseed oil 1,000 mg cap Take  by mouth daily.  ascorbic acid (VITAMIN C) 500 mg tablet Take 1,000 mg by mouth daily.  FEVERFEW PO Take  by mouth daily.  Cholecalciferol, Vitamin D3, 1,000 unit cap Take  by mouth daily.  cinnamon bark (CINNAMON) 500 mg cap Take 1,000 mg by mouth daily. Past History     Past Medical History:  Past Medical History:   Diagnosis Date    Arthritis     Cancer ([address]     right breast    Cardiac murmur     Fall     GERD (gastroesophageal reflux disease)     GERD (gastroesophageal reflux disease)     Gout     Hyperlipidemia     Hyperlipidemia     Hypertension     Osteomalacia     Risk for falls        Past Surgical History:  Past Surgical History:   Procedure Laterality Date    HX APPENDECTOMY  1943    NEUROLOGICAL PROCEDURE UNLISTED  2010    lower back surgery       Family History:  History reviewed. No pertinent family history. Social History:  Social History   Substance Use Topics    Smoking status: Never Smoker    Smokeless tobacco: Never Used    Alcohol use No       Allergies: Allergies   Allergen Reactions    Advil [Ibuprofen] Other (comments)     Upset stomach         Review of Systems   Review of Systems   Respiratory: Positive for shortness of breath (denied by pt). Cardiovascular: Positive for leg swelling. Negative for chest pain. All other systems reviewed and are negative. Physical Exam     Vitals:    06/14/18 1715 06/14/18 1730 06/14/18 1745 06/14/18 1800   BP: (!) 209/51 (!) 203/39 (!) 194/30 (!) 192/27   Pulse: (!) 48 (!) 45 (!) 44 62   Resp: 15 22 28 18   Temp:       SpO2: 97% 95%     Weight:         Physical Exam   Constitutional: She is oriented to person, place, and time. She appears well-developed and well-nourished. Hypertensive   HENT:   Head: Normocephalic and atraumatic. Right Ear: External ear normal.   Left Ear: External ear normal.   Nose: Nose normal.   Mouth/Throat: Oropharynx is clear and moist.   Eyes: Conjunctivae and EOM are normal. Pupils are equal, round, and reactive to light. Right eye exhibits no discharge. Left eye exhibits no discharge. No scleral icterus. Neck: Normal range of motion. Neck supple. No JVD present. No tracheal deviation present. Cardiovascular: Intact distal pulses. An irregular rhythm present. Bradycardia present. Murmur heard. Systolic murmur is present with a grade of 3/6   Pulmonary/Chest: Effort normal and breath sounds normal.   Abdominal: Soft. Bowel sounds are normal. She exhibits no distension. There is no tenderness. No HSM   Musculoskeletal: Normal range of motion. She exhibits no edema. Neurological: She is alert and oriented to person, place, and time. She has normal reflexes. No cranial nerve deficit. She exhibits normal muscle tone. Coordination normal.   Hard of hearing (old). No focal motor weakness. Skin: Skin is warm and dry. No rash noted. Psychiatric: She has a normal mood and affect. Her behavior is normal.   Nursing note and vitals reviewed.         Diagnostic Study Results     Labs -     Recent Results (from the past 12 hour(s))   EKG, 12 LEAD, INITIAL    Collection Time: 06/14/18  5:00 PM   Result Value Ref Range    Ventricular Rate 32 BPM    Atrial Rate 90 BPM    QRS Duration 140 ms    Q-T Interval 544 ms    QTC Calculation (Bezet) 397 ms    Calculated P Axis 68 degrees    Calculated R Axis -49 degrees    Calculated T Axis 102 degrees    Diagnosis       Sinus rhythm with 2nd degree AV block with 2:1 AV conduction with 3:1 AV   conduction with premature ventricular complexes or fusion complexes  Left axis deviation  Left bundle branch block  Abnormal ECG  When compared with ECG of 01-JUN-2018 10:37,  Significant changes have occurred     CBC WITH AUTOMATED DIFF    Collection Time: 06/14/18  5:03 PM   Result Value Ref Range    WBC 5.9 4.6 - 13.2 K/uL    RBC 3.92 (L) 4.20 - 5.30 M/uL    HGB 12.2 12.0 - 16.0 g/dL    HCT 36.3 35.0 - 45.0 %    MCV 92.6 74.0 - 97.0 FL    MCH 31.1 24.0 - 34.0 PG    MCHC 33.6 31.0 - 37.0 g/dL    RDW 14.7 (H) 11.6 - 14.5 %    PLATELET 362 288 - 403 K/uL    MPV 10.9 9.2 - 11.8 FL    NEUTROPHILS 64 40 - 73 %    LYMPHOCYTES 22 21 - 52 %    MONOCYTES 11 (H) 3 - 10 %    EOSINOPHILS 3 0 - 5 %    BASOPHILS 0 0 - 2 %    ABS. NEUTROPHILS 3.8 1.8 - 8.0 K/UL    ABS. LYMPHOCYTES 1.3 0.9 - 3.6 K/UL    ABS. MONOCYTES 0.6 0.05 - 1.2 K/UL    ABS. EOSINOPHILS 0.2 0.0 - 0.4 K/UL    ABS. BASOPHILS 0.0 0.0 - 0.06 K/UL    DF AUTOMATED     PROTHROMBIN TIME + INR    Collection Time: 06/14/18  5:03 PM   Result Value Ref Range    Prothrombin time 13.6 11.5 - 15.2 sec    INR 1.1 0.8 - 1.2     PTT    Collection Time: 06/14/18  5:03 PM   Result Value Ref Range    aPTT 30.3 23.0 - 36.4 SEC       Radiologic Studies -   XR CHEST PORT    (Results Pending)     CT Results  (Last 48 hours)    None        CXR Results  (Last 48 hours)    None        6:11 PM  RADIOLOGY FINDINGS  Chest X-ray shows cardiomegaly, mild prominent pulmonary vascular. Pending review by Radiologist  Recorded by Rimma Mosley ED Scribe, as dictated by Issac Holder MD.    Medications given in the ED-  Medications   lidocaine (XYLOCAINE) 10 mg/mL (1 %) injection (not administered)   midazolam (VERSED) 1 mg/mL injection (not administered)   fentaNYL citrate (PF) 50 mcg/mL injection (not administered)   heparinized saline 2 units/mL 1,000 unit/500 mL infusion (not administered)         Medical Decision Making   I am the first provider for this patient. I reviewed the vital signs, available nursing notes, past medical history, past surgical history, family history and social history. Vital Signs-Reviewed the patient's vital signs.     Pulse Oximetry Analysis - 95% on RA     Cardiac Monitor:  Rate: 33 bpm  Rhythm: Sinus bradycardia with 3rd degree AV block    EKG interpretation: (EMS)  Questionable 3rd degree AV block. 34 bpm, LBBB  EKG read by Katarina Lang MD at 4:32 PM     EKG interpretation: (Preliminary)  Rhythm: sinus bradycardia with 3rd degree AV block. Rate: 32 bpm; LBBB  EKG read by Katarina Lang MD at 5:00 PM     Records Reviewed: Nursing Notes and Old Medical Records    Provider Notes (Medical Decision Making):   DDX: complete or 3rd degree heart block versus second degree type two heart block, arrhythmia, ACS, electrolyte abnormality, conduction defects, does not appear to take medications that affects the heart conduction, does not appear toxic or hypertensive    Procedures:  Procedures    ED Course:   4:53 PM Initial assessment performed. The patients presenting problems have been discussed, and they are in agreement with the care plan formulated and outlined with them. I have encouraged them to ask questions as they arise throughout their visit. 5:05 PM Discussed patient's history, exam, and available diagnostics results with Coco Hood MD, Cardiology, who agrees with 3rd degree AV. Obtain ISTAT CHEM8. Will most likely place pacemaker. 5:40 PM Coco Hood MD called back stating to admit to hospitalist. WIll most likely place a pacemaker but is waiting on electrolyte labs. Nothing to intervene from the ED standpoint. Already talked to pt's son, [facility], who consents to pacemaker placement. 6:04 PM Discussed patient's history, exam, and available diagnostics results with Juanpablo Mcdaniel MD, Hospitalist, who agrees to admit pt to ICU.    6:12 PM Discussed patient with Coco Hood MD, Cardiology, who states to not give pt any blood thinners, including Aspirin. Diagnosis and Disposition     Critical Care Time:   I have spent 63 minutes of critical care time involved in lab review, consultations with specialist, family decision-making, and documentation.   During this entire length of time I was immediately available to the patient. Critical Care: The reason for providing this level of medical care for this critically ill patient was due a critical illness that impaired one or more vital organ systems such that there was a high probability of imminent or life threatening deterioration in the patients condition. This care involved high complexity decision making to assess, manipulate, and support vital system functions, to treat this degreee vital organ system failure and to prevent further life threatening deterioration of the patients condition. Core Measures:  For Hospitalized Patients:    1. Hospitalization Decision Time:  The decision to hospitalize the patient was made by Eric Trinidad MD at 5:00 PM on 6/14/2018    2. Aspirin: Aspirin was not given because the patient did not present with a stroke at the time of their Emergency Department evaluation    6:04 PM  Patient is being admitted to the hospital by Nellie Pratt MD. The results of their tests and reasons for their admission have been discussed with them and/or available family. They convey agreement and understanding for the need to be admitted and for their admission diagnosis. CLINICAL IMPRESSION:    1. Third degree AV block ([address]    2. Dyspnea, unspecified type    3. Essential hypertension      _______________________________    Attestations: This note is prepared by Diogo Hermosillo, acting as Scribe for Eric Trinidad MD.    Eric Trinidad MD:  The scribe's documentation has been prepared under my direction and personally reviewed by me in its entirety.   I confirm that the note above accurately reflects all work, treatment, procedures, and medical decision making performed by me.  _______________________________

## 2018-06-14 NOTE — ED NOTES
TRANSFER - OUT REPORT:    Verbal report given to 937 Sebastian Johnson RN(name) on Alissa Police  being transferred to ICU(unit) after Cath Lab for routine progression of care       Report consisted of patients Situation, Background, Assessment and   Recommendations(SBAR). Information from the following report(s) SBAR, Kardex, ED Summary, MAR, Recent Results and Cardiac Rhythm 2nd degree AV block was reviewed with the receiving nurse. Lines:   Peripheral IV 06/14/18 Left Antecubital (Active)   Site Assessment Clean, dry, & intact 6/14/2018  5:06 PM   Phlebitis Assessment 0 6/14/2018  5:06 PM   Dressing Status Clean, dry, & intact 6/14/2018  5:06 PM   Hub Color/Line Status Pink 6/14/2018  5:06 PM        Opportunity for questions and clarification was provided.       Patient transported with:   Monitor

## 2018-06-14 NOTE — ED NOTES
Report given to cath lab personnel.      Patient off the unit transport to cath lab by cath lab personnel

## 2018-06-14 NOTE — CONSULTS
[facility]                  Pulmonary, Critical Care, and Sleep Medicine     Name: Ruddy Lynn MRN: [id number]   : 1924 Hospital: [facility] [place]    Date: 2018        PCCM Note                                              Consult requesting physician: Dr. Brandan Cervantes  Reason for Consult: complete heart block    IMPRESSION:   · Complete heart block. ER visit on 18 for dizziness/syncope when she had 1st degree AVB with chronic LBBB. · HTN, uncontrolled likely due to above  · Mild CKD   · ? ? Dementia   · Hx of right breast cancer. · GERD    · Code status: Full       RECOMMENDATIONS:   D/w Dr. Guero Samayoa, waiting for BMP and considering temporary transvenous pacemaker placement if K+ ok and if family agrees. BP should get better after temporary pace maker placement, if not, consider therapy. No other acute issues. Keep NPO for now. Monitor renal function. Prophylaxis: DVT Prophylaxis with SCD. GI Prophylaxis -low risk for stress ulcer. Will defer respective systems problem management to primary and other respective consultant and follow patient in ICU with primary and other medical team.  Further recommendations will be based on the patient's response to recommended treatment and results of the investigation ordered. Quality Care: PPI, DVT prophylaxis, HOB elevated, Infection control all reviewed and addressed. Care of plan d/w RN, RT, PT/OT, ST, pharmacist, palliative care team, MDR, patient (answered all questions to satisfaction). High complexity decision making was performed during the evaluation of this patient at high risk for decompensation with multiple organ involvement. Total critical care time spent rendering care exclusive of procedures: 32 min minutes. Subjective/History of Present Illness:     Ruddy Lynn has been seen and evaluated in ER room 1.     Patient is a 80 y.o. female with PMHx significant for HTN, chronic LBBB, GERD, ?dementia had syncope and dizziness needing ER visit on 6/1/18 when EKG showed 1st degree AVB and chronic LBBB, came to ER from NH with CHB. Pt is very poor historian. She doesn't recall why she is in the ER but stated she was told to have not feeling well and mild dyspnea. Denies any cp palpation light headedness dizziness. She has mild leg edema. Review of Systems:  Review of systems not obtained due to patient factors. poor historian. Allergies   Allergen Reactions    Advil [Ibuprofen] Other (comments)     Upset stomach      Past Medical History:   Diagnosis Date    Arthritis     Cancer ([facility] [address]     right breast    Cardiac murmur     Fall     GERD (gastroesophageal reflux disease)     GERD (gastroesophageal reflux disease)     Gout     Hyperlipidemia     Hyperlipidemia     Hypertension     Osteomalacia     Risk for falls       Past Surgical History:   Procedure Laterality Date    HX APPENDECTOMY  1943    NEUROLOGICAL PROCEDURE UNLISTED  2010    lower back surgery      Social History   Substance Use Topics    Smoking status: Never Smoker    Smokeless tobacco: Never Used    Alcohol use No      History reviewed. No pertinent family history. Prior to Admission medications    Medication Sig Start Date End Date Taking? Authorizing Provider   dextran 70-hypromellose (ARTIFICIAL TEARS,[id number],) ophthalmic solution Administer  to both eyes as needed. Sridhar Chase MD   fluticasone (FLONASE) 50 mcg/actuation nasal spray 2 Sprays by Both Nostrils route daily. Sridhar Chase MD   potassium chloride (K-DUR, KLOR-CON) 20 mEq tablet Take  by mouth two (2) times a day. Sridhar Chase MD   peg 400-propylene glycol (SYSTANE, PROPYLENE GLYCOL,) 0.4-0.3 % drop as needed. Sridhar Chase MD   famotidine (PEPCID) 20 mg tablet Take 20 mg by mouth two (2) times a day. Sridhar Chase MD   Aspirin, Buffered 81 mg tab Take  by mouth daily.     Historical Provider   carica papaya (PAPAYA ENZYME) chew Take  by mouth daily. Historical Provider   omega-3 fatty acids-vitamin e (FISH OIL) 1,000 mg cap Take 1 Cap by mouth daily. Historical Provider   CALCIUM CARBONATE/VITAMIN D3 (CALTRATE 600 + D PO) Take  by mouth daily. Historical Provider   BIOTIN PO Take 1,000 mcg by mouth daily. Historical Provider   flaxseed oil 1,000 mg cap Take  by mouth daily. Historical Provider   ascorbic acid (VITAMIN C) 500 mg tablet Take 1,000 mg by mouth daily. Historical Provider   FEVERFEW PO Take  by mouth daily. Historical Provider   Cholecalciferol, Vitamin D3, 1,000 unit cap Take  by mouth daily. Historical Provider   cinnamon bark (CINNAMON) 500 mg cap Take 1,000 mg by mouth daily. Historical Provider     No current facility-administered medications for this encounter. Objective:   Vital Signs:    Visit Vitals    BP (!) 219/45 (BP 1 Location: Right arm, BP Patient Position: At rest)    Pulse (!) 35    Temp 97.7 °F (36.5 °C)    Resp 20    Wt 65.1 kg (143 lb 9.6 oz)    SpO2 95%    BMI 25.44 kg/m2       O2 Device: Room air       Temp (24hrs), Av.7 °F (36.5 °C), Min:97.7 °F (36.5 °C), Max:97.7 °F (36.5 °C)       Intake/Output:   Last shift:           Last 3 shifts:      No intake or output data in the 24 hours ending 18 1715    Last 3 Recorded Weights in this Encounter    18 1703   Weight: 65.1 kg (143 lb 9.6 oz)         Physical Exam:     General/Neurology: Alert, Awake, NAD  Head:   Normocephalic, without obvious abnormality, atraumatic. Eye:   EOM intact, no scleral icterus, no pallor, no cyanosis. Nose:   No sinus tenderness, no erythema, no induration, no discharge  Throat:  Lips, mucosa, and tongue normal. No oral thrush. Neck:   Supple, symmetric. No lymphadenopathy. Trachea midline  Chest wall: No deformity. No rash. Lung: Moderate air entry bilateral equal. No rales. No rhonchi. No wheezing. No stridors. No prolongded expiration.  No accessory muscle use. Heart:   Irregular rate & rhythm. S1 S2 present. Systolic murmur +. No JVD. Abdomen:  Soft. NT. ND. +BS. No masses. Extremities:  Trace pitting b/l pedal edema. No cyanosis. No clubbing. Pulses: 2+ and symmetric in DP. Capillary refill: normal  Lymphatic:  No cervical or supraclavicular palpable lymphadenopathy. Skin:   Color, texture, turgor normal. No rashes or lesions. Data:       Recent Results (from the past 24 hour(s))   EKG, 12 LEAD, INITIAL    Collection Time: 06/14/18  5:00 PM   Result Value Ref Range    Ventricular Rate 32 BPM    Atrial Rate 90 BPM    QRS Duration 140 ms    Q-T Interval 544 ms    QTC Calculation (Bezet) 397 ms    Calculated P Axis 68 degrees    Calculated R Axis -49 degrees    Calculated T Axis 102 degrees    Diagnosis       Sinus rhythm with 2nd degree AV block with 2:1 AV conduction with 3:1 AV   conduction with premature ventricular complexes or fusion complexes  Left axis deviation  Left bundle branch block  Abnormal ECG  When compared with ECG of 01-JUN-2018 10:37,  Significant changes have occurred           Chemistry No results for input(s): GLU, NA, K, CL, CO2, BUN, CREA, CA, MG, PHOS, AGAP, BUCR, TBIL, GPT, AP, TP, ALB, GLOB, AGRAT in the last 72 hours. Lactic Acid No results found for: LAC  No results for input(s): LAC in the last 72 hours. Liver Enzymes Protein, total   Date Value Ref Range Status   06/01/2018 7.0 6.4 - 8.2 g/dL Final     Albumin   Date Value Ref Range Status   06/01/2018 3.2 (L) 3.4 - 5.0 g/dL Final     Globulin   Date Value Ref Range Status   06/01/2018 3.8 2.0 - 4.0 g/dL Final     A-G Ratio   Date Value Ref Range Status   06/01/2018 0.8 0.8 - 1.7   Final     AST (SGOT)   Date Value Ref Range Status   06/01/2018 15 15 - 37 U/L Final     Alk. phosphatase   Date Value Ref Range Status   06/01/2018 73 45 - 117 U/L Final     No results for input(s): TP, ALB, GLOB, AGRAT, SGOT, GPT, AP, TBIL in the last 72 hours.     No lab exists for component: DBIL     CBC w/Diff No results for input(s): WBC, RBC, HGB, HCT, PLT, GRANS, LYMPH, EOS, HGBEXT, HCTEXT, PLTEXT in the last 72 hours. Cardiac Enzymes No results found for: CPK, CK, CKMMB, CKMB, RCK3, CKMBT, CKNDX, CKND1, LAKEISHA, TROPT, TROIQ, GUERRERO, TROPT, TNIPOC, BNP, BNPP     BNP No results found for: BNP, BNPP, XBNPT     Coagulation No results for input(s): PTP, INR, APTT in the last 72 hours. No lab exists for component: INREXT      Thyroid  No results found for: T4, T3U, TSH, TSHEXT    No results found for: T4     Urinalysis Lab Results   Component Value Date/Time    Color YELLOW 06/01/2018 11:38 AM    Appearance CLEAR 06/01/2018 11:38 AM    Specific gravity 1.005 06/01/2018 11:38 AM    pH (UA) 6.0 06/01/2018 11:38 AM    Protein NEGATIVE  06/01/2018 11:38 AM    Glucose NEGATIVE  06/01/2018 11:38 AM    Ketone NEGATIVE  06/01/2018 11:38 AM    Bilirubin NEGATIVE  06/01/2018 11:38 AM    Urobilinogen 0.2 06/01/2018 11:38 AM    Nitrites NEGATIVE  06/01/2018 11:38 AM    Leukocyte Esterase TRACE (A) 06/01/2018 11:38 AM    Epithelial cells FEW 06/01/2018 11:38 AM    Bacteria NEGATIVE  06/01/2018 11:38 AM    WBC 0 to 3 06/01/2018 11:38 AM    RBC 0 to 3 06/01/2018 11:38 AM        Micro  No results for input(s): SDES, CULT in the last 72 hours. No results for input(s): CULT in the last 72 hours. ABG No results for input(s): PHI, PHI, POC2, PCO2I, PO2, PO2I, HCO3, HCO3I, FIO2, FIO2I in the last 72 hours. PFT       Ultrasound       LE Doppler       ECHO       CT (Most Recent) (CT chest reviewed by me)   Results from Hospital Encounter encounter on 04/24/18   CT HEAD WO CONT   Narrative EXAM: CT head    INDICATION: Traumatic head injury. COMPARISON: 3/21/2018.     TECHNIQUE: Axial CT imaging of the head was performed without intravenous  contrast.    One or more dose reduction techniques were used on this CT: automated exposure  control, adjustment of the mAs and/or kVp according to patient's size, and  iterative reconstruction techniques. The specific techniques utilized on this CT  exam have been documented in the patient's electronic medical record.  _______________    FINDINGS:    BRAIN AND POSTERIOR FOSSA: There is no intracranial hemorrhage, mass effect, or  midline shift. There is a moderate degree of sulcal enlargement and ventricular  dilatation consistent with cortical atrophy. There is hypoattenuation of the  periventricular white matter, which is nonspecific but most likely represents  changes from chronic microangiopathy. EXTRA-AXIAL SPACES AND MENINGES: There are no abnormal extra-axial fluid  collections. CALVARIUM: Intact. SINUSES: Clear. OTHER: None.    _______________         Impression IMPRESSION:    1. No acute intracranial process, specifically, no evidence of intracranial  hemorrhage, mass effect, or midline shift. 2. Senescent changes of the brain including cortical atrophy and findings most  suggestive of chronic microvascular ischemia. Please note that CT may be negative in the setting of acute infarction. XR (Most Recent). CXR  reviewed by me and compared with previous CXR   Results from Hospital Encounter encounter on 06/01/18   XR CHEST PORT   Narrative A portable AP radiograph of the chest was obtained at 1154 hours:  INDICATION:  Dizziness, shortness of breath. COMPARISON:  5/6/2009. FINDINGS:     Heart and mediastinum: Unremarkable. Lungs and pleura: Clear without consolidation or pleural effusion. Lungs are  hypoinflated. Cardiac pads overlie the right hemithorax. Aorta: Mildly tortuous and partially calcified. Bones: Degenerative changes are seen throughout the spine. Other: None. Impression Impression:    Hypoinflation, otherwise unremarkable.              Urmila Munoz MD  6/14/2018

## 2018-06-14 NOTE — H&P
Date of Surgery Update:  Ruddy Lynn was seen and examined. History and physical has been reviewed. The patient has been examined. There have been no significant clinical changes since the completion of the originally dated History and Physical.    Patient assessed and is candidate for moderate sedation.       Signed By: Karolina Lozano MD     June 14, 2018 6:56 PM

## 2018-06-15 ENCOUNTER — APPOINTMENT (OUTPATIENT)
Dept: GENERAL RADIOLOGY | Age: 83
DRG: 243 | End: 2018-06-15
Attending: INTERNAL MEDICINE
Payer: MEDICARE

## 2018-06-15 LAB
ANION GAP SERPL CALC-SCNC: 11 MMOL/L (ref 3–18)
ANION GAP SERPL CALC-SCNC: 9 MMOL/L (ref 3–18)
BASOPHILS # BLD: 0 K/UL (ref 0–0.06)
BASOPHILS NFR BLD: 0 % (ref 0–2)
BUN SERPL-MCNC: 21 MG/DL (ref 7–18)
BUN SERPL-MCNC: 22 MG/DL (ref 7–18)
BUN/CREAT SERPL: 14 (ref 12–20)
BUN/CREAT SERPL: 15 (ref 12–20)
CA-I SERPL-SCNC: 1.17 MMOL/L (ref 1.12–1.32)
CALCIUM SERPL-MCNC: 8.4 MG/DL (ref 8.5–10.1)
CALCIUM SERPL-MCNC: 8.5 MG/DL (ref 8.5–10.1)
CHLORIDE SERPL-SCNC: 104 MMOL/L (ref 100–108)
CHLORIDE SERPL-SCNC: 105 MMOL/L (ref 100–108)
CO2 SERPL-SCNC: 21 MMOL/L (ref 21–32)
CO2 SERPL-SCNC: 24 MMOL/L (ref 21–32)
CREAT SERPL-MCNC: 1.5 MG/DL (ref 0.6–1.3)
CREAT SERPL-MCNC: 1.51 MG/DL (ref 0.6–1.3)
DIFFERENTIAL METHOD BLD: ABNORMAL
EOSINOPHIL # BLD: 0.1 K/UL (ref 0–0.4)
EOSINOPHIL NFR BLD: 1 % (ref 0–5)
ERYTHROCYTE [DISTWIDTH] IN BLOOD BY AUTOMATED COUNT: 14.6 % (ref 11.6–14.5)
GLUCOSE SERPL-MCNC: 124 MG/DL (ref 74–99)
GLUCOSE SERPL-MCNC: 127 MG/DL (ref 74–99)
HCT VFR BLD AUTO: 36.4 % (ref 35–45)
HGB BLD-MCNC: 12 G/DL (ref 12–16)
LYMPHOCYTES # BLD: 0.8 K/UL (ref 0.9–3.6)
LYMPHOCYTES NFR BLD: 11 % (ref 21–52)
MAGNESIUM SERPL-MCNC: 2.2 MG/DL (ref 1.6–2.6)
MCH RBC QN AUTO: 30.5 PG (ref 24–34)
MCHC RBC AUTO-ENTMCNC: 33 G/DL (ref 31–37)
MCV RBC AUTO: 92.4 FL (ref 74–97)
MONOCYTES # BLD: 0.6 K/UL (ref 0.05–1.2)
MONOCYTES NFR BLD: 9 % (ref 3–10)
NEUTS SEG # BLD: 5.8 K/UL (ref 1.8–8)
NEUTS SEG NFR BLD: 79 % (ref 40–73)
PHOSPHATE SERPL-MCNC: 4.7 MG/DL (ref 2.5–4.9)
PLATELET # BLD AUTO: 175 K/UL (ref 135–420)
PMV BLD AUTO: 10.1 FL (ref 9.2–11.8)
POTASSIUM SERPL-SCNC: 4.7 MMOL/L (ref 3.5–5.5)
POTASSIUM SERPL-SCNC: 5.1 MMOL/L (ref 3.5–5.5)
POTASSIUM SERPL-SCNC: 5.6 MMOL/L (ref 3.5–5.5)
RBC # BLD AUTO: 3.94 M/UL (ref 4.2–5.3)
SODIUM SERPL-SCNC: 137 MMOL/L (ref 136–145)
SODIUM SERPL-SCNC: 137 MMOL/L (ref 136–145)
TSH SERPL DL<=0.05 MIU/L-ACNC: 3.16 UIU/ML (ref 0.36–3.74)
WBC # BLD AUTO: 7.2 K/UL (ref 4.6–13.2)

## 2018-06-15 PROCEDURE — 93306 TTE W/DOPPLER COMPLETE: CPT

## 2018-06-15 PROCEDURE — 74011000250 HC RX REV CODE- 250: Performed by: HOSPITALIST

## 2018-06-15 PROCEDURE — 84443 ASSAY THYROID STIM HORMONE: CPT | Performed by: HOSPITALIST

## 2018-06-15 PROCEDURE — 74011250636 HC RX REV CODE- 250/636: Performed by: INTERNAL MEDICINE

## 2018-06-15 PROCEDURE — 74011000272 HC RX REV CODE- 272: Performed by: INTERNAL MEDICINE

## 2018-06-15 PROCEDURE — 02HK3JZ INSERTION OF PACEMAKER LEAD INTO RIGHT VENTRICLE, PERCUTANEOUS APPROACH: ICD-10-PCS | Performed by: INTERNAL MEDICINE

## 2018-06-15 PROCEDURE — 65610000006 HC RM INTENSIVE CARE

## 2018-06-15 PROCEDURE — 71045 X-RAY EXAM CHEST 1 VIEW: CPT

## 2018-06-15 PROCEDURE — 74011250637 HC RX REV CODE- 250/637: Performed by: INTERNAL MEDICINE

## 2018-06-15 PROCEDURE — 74011636320 HC RX REV CODE- 636/320: Performed by: INTERNAL MEDICINE

## 2018-06-15 PROCEDURE — 74011000250 HC RX REV CODE- 250: Performed by: INTERNAL MEDICINE

## 2018-06-15 PROCEDURE — 77010033678 HC OXYGEN DAILY

## 2018-06-15 PROCEDURE — 82330 ASSAY OF CALCIUM: CPT | Performed by: HOSPITALIST

## 2018-06-15 PROCEDURE — 74011250636 HC RX REV CODE- 250/636

## 2018-06-15 PROCEDURE — 84132 ASSAY OF SERUM POTASSIUM: CPT | Performed by: HOSPITALIST

## 2018-06-15 PROCEDURE — 02H63JZ INSERTION OF PACEMAKER LEAD INTO RIGHT ATRIUM, PERCUTANEOUS APPROACH: ICD-10-PCS | Performed by: INTERNAL MEDICINE

## 2018-06-15 PROCEDURE — 36415 COLL VENOUS BLD VENIPUNCTURE: CPT | Performed by: HOSPITALIST

## 2018-06-15 PROCEDURE — 74011250637 HC RX REV CODE- 250/637: Performed by: HOSPITALIST

## 2018-06-15 PROCEDURE — 0JH606Z INSERTION OF PACEMAKER, DUAL CHAMBER INTO CHEST SUBCUTANEOUS TISSUE AND FASCIA, OPEN APPROACH: ICD-10-PCS | Performed by: INTERNAL MEDICINE

## 2018-06-15 PROCEDURE — 93005 ELECTROCARDIOGRAM TRACING: CPT

## 2018-06-15 PROCEDURE — 80048 BASIC METABOLIC PNL TOTAL CA: CPT | Performed by: INTERNAL MEDICINE

## 2018-06-15 PROCEDURE — 84100 ASSAY OF PHOSPHORUS: CPT | Performed by: HOSPITALIST

## 2018-06-15 PROCEDURE — C1892 INTRO/SHEATH,FIXED,PEEL-AWAY: HCPCS

## 2018-06-15 PROCEDURE — 83735 ASSAY OF MAGNESIUM: CPT | Performed by: HOSPITALIST

## 2018-06-15 PROCEDURE — 85025 COMPLETE CBC W/AUTO DIFF WBC: CPT | Performed by: HOSPITALIST

## 2018-06-15 RX ORDER — FENTANYL CITRATE 50 UG/ML
INJECTION, SOLUTION INTRAMUSCULAR; INTRAVENOUS
Status: COMPLETED
Start: 2018-06-15 | End: 2018-06-15

## 2018-06-15 RX ORDER — CEFAZOLIN SODIUM 1 G/3ML
INJECTION, POWDER, FOR SOLUTION INTRAMUSCULAR; INTRAVENOUS
Status: COMPLETED
Start: 2018-06-15 | End: 2018-06-15

## 2018-06-15 RX ORDER — LIDOCAINE HYDROCHLORIDE 10 MG/ML
10-300 INJECTION INFILTRATION; PERINEURAL
Status: DISCONTINUED | OUTPATIENT
Start: 2018-06-15 | End: 2018-06-15 | Stop reason: HOSPADM

## 2018-06-15 RX ORDER — SODIUM CHLORIDE 9 MG/ML
25 INJECTION, SOLUTION INTRAVENOUS CONTINUOUS
Status: DISCONTINUED | OUTPATIENT
Start: 2018-06-15 | End: 2018-06-15 | Stop reason: HOSPADM

## 2018-06-15 RX ORDER — SODIUM CHLORIDE 0.9 % (FLUSH) 0.9 %
5-10 SYRINGE (ML) INJECTION EVERY 8 HOURS
Status: DISCONTINUED | OUTPATIENT
Start: 2018-06-15 | End: 2018-06-18 | Stop reason: HOSPADM

## 2018-06-15 RX ORDER — CEFAZOLIN SODIUM/WATER 2 G/20 ML
2 SYRINGE (ML) INTRAVENOUS EVERY 8 HOURS
Status: COMPLETED | OUTPATIENT
Start: 2018-06-15 | End: 2018-06-15

## 2018-06-15 RX ORDER — FENTANYL CITRATE 50 UG/ML
25-200 INJECTION, SOLUTION INTRAMUSCULAR; INTRAVENOUS
Status: DISCONTINUED | OUTPATIENT
Start: 2018-06-15 | End: 2018-06-15 | Stop reason: HOSPADM

## 2018-06-15 RX ORDER — FUROSEMIDE 10 MG/ML
20 INJECTION INTRAMUSCULAR; INTRAVENOUS ONCE
Status: COMPLETED | OUTPATIENT
Start: 2018-06-15 | End: 2018-06-15

## 2018-06-15 RX ORDER — HYDROCHLOROTHIAZIDE 25 MG/1
25 TABLET ORAL DAILY
Status: DISCONTINUED | OUTPATIENT
Start: 2018-06-15 | End: 2018-06-17

## 2018-06-15 RX ORDER — AMLODIPINE BESYLATE 5 MG/1
5 TABLET ORAL DAILY
Status: DISCONTINUED | OUTPATIENT
Start: 2018-06-15 | End: 2018-06-16

## 2018-06-15 RX ORDER — MIDAZOLAM HYDROCHLORIDE 1 MG/ML
INJECTION, SOLUTION INTRAMUSCULAR; INTRAVENOUS
Status: COMPLETED
Start: 2018-06-15 | End: 2018-06-15

## 2018-06-15 RX ORDER — SODIUM POLYSTYRENE SULFONATE 15 G/60ML
15 SUSPENSION ORAL; RECTAL
Status: COMPLETED | OUTPATIENT
Start: 2018-06-15 | End: 2018-06-15

## 2018-06-15 RX ORDER — HYDROCHLOROTHIAZIDE 25 MG/1
25 TABLET ORAL DAILY
Status: DISCONTINUED | OUTPATIENT
Start: 2018-06-15 | End: 2018-06-15

## 2018-06-15 RX ORDER — ACETAMINOPHEN 325 MG/1
650 TABLET ORAL
Status: DISCONTINUED | OUTPATIENT
Start: 2018-06-15 | End: 2018-06-18 | Stop reason: HOSPADM

## 2018-06-15 RX ORDER — GENTAMICIN SULFATE 40 MG/ML
80 INJECTION, SOLUTION INTRAMUSCULAR; INTRAVENOUS ONCE
Status: DISCONTINUED | OUTPATIENT
Start: 2018-06-15 | End: 2018-06-15

## 2018-06-15 RX ORDER — SODIUM CHLORIDE 0.9 % (FLUSH) 0.9 %
5-10 SYRINGE (ML) INJECTION AS NEEDED
Status: DISCONTINUED | OUTPATIENT
Start: 2018-06-15 | End: 2018-06-18 | Stop reason: HOSPADM

## 2018-06-15 RX ORDER — MIDAZOLAM HYDROCHLORIDE 1 MG/ML
.5-2 INJECTION, SOLUTION INTRAMUSCULAR; INTRAVENOUS
Status: DISCONTINUED | OUTPATIENT
Start: 2018-06-15 | End: 2018-06-15 | Stop reason: HOSPADM

## 2018-06-15 RX ADMIN — FENTANYL CITRATE 50 MCG: 50 INJECTION, SOLUTION INTRAMUSCULAR; INTRAVENOUS at 09:03

## 2018-06-15 RX ADMIN — HYDROCHLOROTHIAZIDE 25 MG: 25 TABLET ORAL at 11:40

## 2018-06-15 RX ADMIN — IOPAMIDOL 25 ML: 510 INJECTION, SOLUTION INTRAVASCULAR at 08:20

## 2018-06-15 RX ADMIN — MIDAZOLAM HYDROCHLORIDE 0.5 MG: 1 INJECTION, SOLUTION INTRAMUSCULAR; INTRAVENOUS at 08:28

## 2018-06-15 RX ADMIN — FAMOTIDINE 20 MG: 10 INJECTION, SOLUTION INTRAVENOUS at 22:18

## 2018-06-15 RX ADMIN — MIDAZOLAM HYDROCHLORIDE 2 MG: 1 INJECTION, SOLUTION INTRAMUSCULAR; INTRAVENOUS at 08:17

## 2018-06-15 RX ADMIN — FENTANYL CITRATE 100 MCG: 50 INJECTION, SOLUTION INTRAMUSCULAR; INTRAVENOUS at 08:17

## 2018-06-15 RX ADMIN — Medication 10 ML: at 11:44

## 2018-06-15 RX ADMIN — SODIUM POLYSTYRENE SULFONATE 15 G: 15 SUSPENSION ORAL; RECTAL at 11:40

## 2018-06-15 RX ADMIN — Medication 2 G: at 15:51

## 2018-06-15 RX ADMIN — FUROSEMIDE 20 MG: 10 INJECTION, SOLUTION INTRAMUSCULAR; INTRAVENOUS at 11:39

## 2018-06-15 RX ADMIN — Medication 1000 MG: at 11:40

## 2018-06-15 RX ADMIN — PROMETHAZINE HYDROCHLORIDE 12.5 MG: 25 INJECTION INTRAMUSCULAR; INTRAVENOUS at 12:34

## 2018-06-15 RX ADMIN — LIDOCAINE HYDROCHLORIDE 20 ML: 10 INJECTION, SOLUTION INFILTRATION; PERINEURAL at 08:15

## 2018-06-15 RX ADMIN — FLUTICASONE PROPIONATE 2 SPRAY: 50 SPRAY, METERED NASAL at 11:40

## 2018-06-15 RX ADMIN — SODIUM CHLORIDE 25 ML/HR: 900 INJECTION, SOLUTION INTRAVENOUS at 08:19

## 2018-06-15 RX ADMIN — HYDRALAZINE HYDROCHLORIDE 20 MG: 20 INJECTION INTRAMUSCULAR; INTRAVENOUS at 11:52

## 2018-06-15 RX ADMIN — FENTANYL CITRATE 50 MCG: 50 INJECTION, SOLUTION INTRAMUSCULAR; INTRAVENOUS at 08:35

## 2018-06-15 RX ADMIN — Medication 10 ML: at 15:48

## 2018-06-15 RX ADMIN — Medication 10 ML: at 14:00

## 2018-06-15 RX ADMIN — CEFAZOLIN: 1 INJECTION, POWDER, FOR SOLUTION INTRAMUSCULAR; INTRAVENOUS; PARENTERAL at 08:00

## 2018-06-15 RX ADMIN — FENTANYL CITRATE 50 MCG: 50 INJECTION, SOLUTION INTRAMUSCULAR; INTRAVENOUS at 08:45

## 2018-06-15 RX ADMIN — Medication 10 ML: at 11:43

## 2018-06-15 RX ADMIN — CARBOXYMETHYLCELLULOSE SODIUM 1 DROP: 5 SOLUTION/ DROPS OPHTHALMIC at 13:04

## 2018-06-15 RX ADMIN — Medication 2 G: at 22:24

## 2018-06-15 RX ADMIN — Medication 10 ML: at 22:19

## 2018-06-15 RX ADMIN — GENTAMICIN SULFATE: 40 INJECTION, SOLUTION INTRAMUSCULAR; INTRAVENOUS at 09:05

## 2018-06-15 RX ADMIN — FENTANYL CITRATE 50 MCG: 50 INJECTION, SOLUTION INTRAMUSCULAR; INTRAVENOUS at 08:26

## 2018-06-15 RX ADMIN — FAMOTIDINE 20 MG: 10 INJECTION, SOLUTION INTRAVENOUS at 11:39

## 2018-06-15 RX ADMIN — WATER 1 G: 1 INJECTION INTRAMUSCULAR; INTRAVENOUS; SUBCUTANEOUS at 08:00

## 2018-06-15 RX ADMIN — Medication 10 ML: at 13:04

## 2018-06-15 NOTE — PROGRESS NOTES
Reason for Admission: c/o SOB                     RRAT Score:  12Plan for utilizing home health:  TBD                        Likelihood of Readmission:  TBD                         Transition of Care Plan:  Chart reviewed pt arrived from [facility] assist living via EMS for SOB at this time pt remains in ICU, pacemake placed, not ready for d/c at this time cm will follow up on Monday.

## 2018-06-15 NOTE — PROGRESS NOTES
TRANSFER - OUT REPORT:  Verbal report given to Ernesto Fan RN(name) on Soila Ruano being transferred to ICU(unit) for routine post - op   Report consisted of patients Situation, Background, Assessment and   Recommendations(SBAR). Information from the following report(s) SBAR, Kardex, Procedure Summary, MAR, Recent Results and Cardiac Rhythm Paced 50bpm was reviewed with the receiving nurse. Cath Lab Report:    Procedure:  [ ] LHC  [ ] Diana Kosse  [ ] PTCA   [ ] Peripheral   [x ] Temporary Pacemaker [ ] KANA  [ ] [address]    Access site:   [x ] Right [ ] Left  [ ] Radial [ ] Brachial [x ] Femoral Vein  [ ] Jugular [ ] Chest Wall      Sheath:           [ ] Pulled in Cath Lab   [x ] In place   [ ] To be pulled after:         Closure:          [ ] Radial Band  [ ] Manual Pressure     [ ] [place]     [ ] Star Close    [ ] Per Close    [ ] Safe Guard    Site Assessment:   [ x] Clean, Dry, No bleeding    [ ] Minor oozing          [ ] Hematoma: Description:    Stents(s) Placement:  [ ] Left Main:                 [ ] LAD:                [ ] Circ:                [ ] RCA:                [ ] EF:     [ ] Peripheral:      [x ] N/A    Infusion [ ]Angiomax [ ] Integrelin [ ] Heparin d/c'd:      Intra procedure Medications:    Fentanyl:  Versed:  Heparin:  Antiplatelet:  Other:    Lines:        Peripheral IV 06/14/18 Left Antecubital (Active)   Site Assessment Clean, dry, & intact 6/14/2018  5:06 PM   Phlebitis Assessment 0 6/14/2018  5:06 PM   Dressing Status Clean, dry, & intact 6/14/2018  5:06 PM   Hub Color/Line Status Pink 6/14/2018  5:06 PM     Sheath 06/14/18 (Active)   Site Assessment Clean, dry, & intact 6/14/2018  6:58 PM   Hub Color/Line Status Green 6/14/2018  6:58 PM       Patient Vitals for the past 4 hrs:   Temp Pulse Resp BP SpO2   06/14/18 1940 98 °F (36.7 °C) (!) 51 16 (!) 191/121 98 %   06/14/18 1800 - 62 18 (!) 192/27 -   06/14/18 1745 - (!) 44 28 (!) 194/30 -   06/14/18 1730 - (!) 45 22 (!) 203/39 95 %   06/14/18 1710 - (!) 48 15 (!) 209/51 97 %   06/14/18 1703 97.7 °F (36.5 °C) (!) 35 20 (!) 219/45 95 %         Extended / Orthostatic Vitals:    Vital Signs  Level of Consciousness: Alert (06/14/18 1940)  Temp: 98 °F (36.7 °C) (06/14/18 1940)  Temp Source: Oral (06/14/18 1940)  Pulse (Heart Rate): (!) 51 (06/14/18 1940)  Heart Rate Source: Monitor (06/14/18 1940)  Cardiac Rhythm: Paced (06/14/18 1940)  Resp Rate: 16 (06/14/18 1940)  BP: (!) 191/121 (06/14/18 1940)  MAP (Monitor): 70 (06/14/18 1800)  MAP (Calculated): 144 (06/14/18 1940)  BP 1 Location: Right arm (06/14/18 1940)  BP Patient Position: At rest;Supine (06/14/18 1940)  MEWS Score: 1 (06/14/18 1940)         Oxygen Therapy  O2 Sat (%): 98 % (06/14/18 1940)  O2 Device: Room air (06/14/18 1703)          Opportunity for questions and clarification was provided.

## 2018-06-15 NOTE — PROGRESS NOTES
2020 Patient arrived on unit. Temporary pacer secured to right leg. Femoral site soft, no sign of bleeding or hematoma,  dressing CDI at this time. 2045 Admission assessment completed, see EMR. Right femoral site soft, no sign of bleeding or hematoma, dressing CDI. Pulse to dorsalis pedis and posterior tibial palpable. 2200 Patient /53 called Dr. Ernesto Cordero to clarify if it was appropriate to given hydralazine with patient diastolic BP of 53. Dr. Ernesto Cordero voiced that it was ok to given the hydralazine. Also communicated that patient's potassium level 5.7. New order given for potassium lab repeat    0042 Potassium level was 5.1 and treading down. 0000 Reassessment completed, see EMR. Right femoral site soft, no sign of bleeding or hematoma, dressing CDI. Pulses to dorsalis pedis and posterior tibial palpable. Used doppler to confirm. 8112 Reassessment completed, see EMR Right femoral site soft, no sign of bleeding or hematoma, dressing CDI. Pulse to dorsalis pedis and posterior tibial palpable. Kiya from cath lab called informed him that patient had an uneventful night and remained paced with HR in 50's. Confirmed that patient had CHG bath this morning.     0705 Noted that patient's cardiac monitor had some artifact, immediately went to patient's room. Found patient pulling on her lines and noted that she had pulled the wires from the pacer box. Patient was connected to the transcutaneous pacer pads and leads and begun transcutaneous pacing the patient a HR of 50. Cath lab tech was paged and was able to connect patient back up to the temporary pacer. 0745 Patient was taken off unit to cath lab. Bedside and Verbal shift change report given to JEREMIE Myrick RN (oncoming nurse)Report included the following information SBAR, Kardex, Intake/Output, MAR and Recent Results.

## 2018-06-15 NOTE — PROCEDURES
Dual Chamber Pacemaker      The patient received prophylactic antibiotics in route to the laboratory. Once there, She was prepped and draped in the usual sterile fashion. The entry site was anesthetized with 1% lidocaine locally. A 6cm incision was then made parallel to the left clavicle. Utilizing blunt and Bovie dissection, the prepectoralis fascia was identified and a pocket was opened and the generator was explanted. Gauze was placed in the pocket. Using the modified Seldinger technique, a guidewire was placed in the left subclavian vein. This was exchanged for a tear-away sheath. Through this, the ventricular lead was passed and the sheath was torn away. This lead was actively fixed in the right ventricular apex. After documenting appropriate sensing and capture thresholds, the lead was secured to the pectoralis floor with two Ethibond sutures around the suture sleeve. Using the modified Seldinger technique, a guidewire was placed in the left subclavian vein. This was exchanged for a tear-away sheath. Through this, the atrial lead was passed and the sheath was torn away. This lead was actively fixed in the right atrial appendage. After documenting appropriate sensing and capture thresholds, the lead was secured to the pectoralis floor with two Ethibond sutures around the suture sleeve. The gauze was removed from the pocket. The pocket was irrigated with copious amounts of antibiotic solution. The leads were connected to the generator and the generator was placed within the pocket. The pocket was closed with a running suture of 2-0 Vicryl. The skin was closed with staples. Antibiotic ointment and a dressing were applied. The patient was returned to the recovery room in stable condition. Complications: None      Implant Data: The patient received a [employer] Edora generator, serial number [id number]. This was combined with a [employer] Solia 60 lead, serial [id number]. R waves were 20 millivolts.  The capture threshold was 0.6 volts at 0.4msec. The lead impedance was 647 ohms. There was no diaphragmatic pacing at 10 volt output. The atrial lead was a [employer] [id number], serial number [id number]. P waves were 5.5 millivolts. Atrial capture threshold was 0.9 volts at 0.4 msec. Atrial lead impedance was 520 ohms. There was no diaphragmatic pacing at 10 volt output. Assistant: Zonia Bower    EBL: minimal     Specimen: none    Plan: Monitor overnight. Check numbers in AM.    This pacing system is MRI compatible. [employer].     Stephane Tanner MD

## 2018-06-15 NOTE — PROGRESS NOTES
Report received at patient bedside using SBAR format. Cath Lab personnel in room to transport patient down to have procedure of permanent pacemaker by Dr. Too Lopez. 0929  Pt returned from cath lab after procedure for permanent pacer. VSS. Pt is alert and awake, oriented to person only. Reorients to place, time and situation easily. Pedal pulses palpable bilaterally. Dressing to left upper chest CDI, 4x4 covered with guaze and tegaderm. L arm in shoulder immobilizer. Pt denies pain. Right groin dressing intact and without complications, without hematoma or bleeding at site. Pt supine with legs straight. 80  Pts son in to see patient. Pt awake and alert and able to have a conversation with him. Continues to reorient well. Taking po without difficulty and denies pain. 1152  Hydralazine given prn for  per MD orders. 1225  Pt c/o nausea. Phenergan given per MD orders pr    1750  Pts son at bedside and updated on pt status. Pt alert and reorients to situation, place and time well. VSS. 1930  Report given to Radha Mustafa RN, oncoming nurse at bedside using SBAR format. All questions answered and clinicals reviewed. Pedal pulses palpable. L groin site checked with Robert Woodson and no hematoma or swelling or bleeding at site. Pacemaker dressing with old dried blood noticeable, no bleeding or hematoma at present. L arm in immobilizer.

## 2018-06-15 NOTE — PROGRESS NOTES
[facility]                  Pulmonary, Critical Care, and Sleep Medicine     Name: Robert Boggs MRN: [id number]   : 1924 Hospital: [facility] [place]    Date: 6/15/2018        [facility] Note                                                IMPRESSION:   · Complete heart block. S/p PPM placed 6/15/18. · HTN, uncontrolled   · Mild CKD   · Hyperkalemia   · ? ? Dementia   · Hx of right breast cancer. · GERD    · Code status: Full       RECOMMENDATIONS:   D/w Dr. Tavo Ortiz and Dr. Lucille Anderson. Start HCTZ 25 mg daily. For hyperkalemia, till give kayexalate 15 gm and lasix 20 mg iv x1 now. Repeat BMP at 1 pm.   LUE immobilized in sling for new pacemaker. Prophylaxis: DVT Prophylaxis with SCD. GI Prophylaxis -pepcid    Will defer respective systems problem management to primary and other respective consultant and follow patient in ICU with primary and other medical team.  Further recommendations will be based on the patient's response to recommended treatment and results of the investigation ordered. Quality Care: PPI, DVT prophylaxis, HOB elevated, Infection control all reviewed and addressed. Care of plan d/w RN, RT, pharmacist, palliative care team, MDR, patient (answered all questions to satisfaction). Moderate complexity decision making was performed during the evaluation of this patient at high risk for decompensation with multiple organ involvement. Subjective/History of Present Illness:     Patient is a 80 y.o. female with PMHx significant for HTN, chronic LBBB, GERD, ?dementia had syncope and dizziness needing ER visit on 18 when EKG showed AVB and chronic LBBB, came to ER from NH with CHB, s/p PPM placement on 6/15/18.    6/15/18:  Transvenous pacer changed to PPM today. BP still elevated  No fever chills cough cp palpitation nausea vomiting abd pain. No overnight issues.          Allergies   Allergen Reactions    Advil [Ibuprofen] Other (comments) Upset stomach      Past Medical History:   Diagnosis Date    Arthritis     Cancer [facility])     right breast    Cardiac murmur     Fall     GERD (gastroesophageal reflux disease)     GERD (gastroesophageal reflux disease)     Gout     Hyperlipidemia     Hyperlipidemia     Hypertension     Osteomalacia     Risk for falls       Past Surgical History:   Procedure Laterality Date    HX APPENDECTOMY  1943    NEUROLOGICAL PROCEDURE UNLISTED  2010    lower back surgery    UT MOD SED SAME PHYS/QHP INITIAL 15 MINS 5/> YRS  6/15/2018           Social History   Substance Use Topics    Smoking status: Never Smoker    Smokeless tobacco: Never Used    Alcohol use No      History reviewed. No pertinent family history. Prior to Admission medications    Medication Sig Start Date End Date Taking? Authorizing Provider   dextran 70-hypromellose (ARTIFICIAL TEARS,[id number],) ophthalmic solution Administer  to both eyes as needed. Sridhar Chase MD   fluticasone (FLONASE) 50 mcg/actuation nasal spray 2 Sprays by Both Nostrils route daily. Sridhar Chase MD   potassium chloride (K-DUR, KLOR-CON) 20 mEq tablet Take  by mouth two (2) times a day. Sridhar Chase MD   peg 400-propylene glycol (SYSTANE, PROPYLENE GLYCOL,) 0.4-0.3 % drop as needed. Sridhar Chase MD   famotidine (PEPCID) 20 mg tablet Take 20 mg by mouth two (2) times a day. Sridhar Chase MD   Aspirin, Buffered 81 mg tab Take  by mouth daily. Historical Provider   carica papaya (PAPAYA ENZYME) chew Take  by mouth daily. Historical Provider   omega-3 fatty acids-vitamin e (FISH OIL) 1,000 mg cap Take 1 Cap by mouth daily. Historical Provider   CALCIUM CARBONATE/VITAMIN D3 (CALTRATE 600 + D PO) Take  by mouth daily. Historical Provider   BIOTIN PO Take 1,000 mcg by mouth daily. Historical Provider   flaxseed oil 1,000 mg cap Take  by mouth daily. Historical Provider   ascorbic acid (VITAMIN C) 500 mg tablet Take 1,000 mg by mouth daily. Historical Provider   FEVERFEW PO Take  by mouth daily. Historical Provider   Cholecalciferol, Vitamin D3, 1,000 unit cap Take  by mouth daily. Historical Provider   cinnamon bark (CINNAMON) 500 mg cap Take 1,000 mg by mouth daily. Historical Provider     Current Facility-Administered Medications   Medication Dose Route Frequency    ceFAZolin (ANCEF) 1 gram injection        sodium chloride (NS) flush 5-10 mL  5-10 mL IntraVENous Q8H    ceFAZolin (ANCEF) 2 g/20 mL in sterile water IV syringe  2 g IntraVENous Q8H    famotidine (PF) (PEPCID) 20 mg in sodium chloride 0.9% 10 mL injection  20 mg IntraVENous Q12H    fluticasone (FLONASE) 50 mcg/actuation nasal spray 2 Spray  2 Spray Both Nostrils DAILY    ascorbic acid (vitamin C) (VITAMIN C) tablet 1,000 mg  1,000 mg Oral DAILY    sodium chloride (NS) flush 5-10 mL  5-10 mL IntraVENous Q8H         Objective:   Vital Signs:    Visit Vitals    BP (!) 171/39    Pulse (!) 53    Temp 97.7 °F (36.5 °C)    Resp (!) 31    Ht 5' 4\" (1.626 m)    Wt 65.1 kg (143 lb 9.6 oz)    SpO2 97%    BMI 24.65 kg/m2       O2 Device: Room air       Temp (24hrs), Av.8 °F (36.6 °C), Min:97.7 °F (36.5 °C), Max:98 °F (36.7 °C)       Intake/Output:   Last shift:           Last 3 shifts:  1901 - 06/15 0700  In: -   Out: 1150 [Urine:1150]      Intake/Output Summary (Last 24 hours) at 06/15/18 0942  Last data filed at 06/15/18 0650   Gross per 24 hour   Intake                0 ml   Output             1150 ml   Net            -1150 ml       Last 3 Recorded Weights in this Encounter    18 1703   Weight: 65.1 kg (143 lb 9.6 oz)         Physical Exam:     General/Neurology: Alert, Awake, NAD. Head:   Normocephalic, without obvious abnormality, atraumatic. Eye:   EOM intact, no scleral icterus, no pallor, no cyanosis. Throat:  No oral thrush. Neck:   Supple, symmetric. No lymphadenopathy. Trachea midline  Chest wall: Left anterior chest new PPM under dressing.  No hematoma. Lung: Moderate air entry bilateral equal. No rales. No rhonchi. No wheezing. No stridors. No prolongded expiration. No accessory muscle use. Heart:   Irregular rate & rhythm. S1 S2 present. Systolic murmur +. No JVD. Abdomen:  Soft. NT. ND. Extremities:  Trace pitting b/l pedal edema. No cyanosis. No clubbing. Pulses: 2+ and symmetric in DP. Capillary refill: normal  Lymphatic:  No cervical palpable lymphadenopathy. Skin:   As above. Data:       Recent Results (from the past 24 hour(s))   EKG, 12 LEAD, INITIAL    Collection Time: 06/14/18  5:00 PM   Result Value Ref Range    Ventricular Rate 32 BPM    Atrial Rate 90 BPM    QRS Duration 140 ms    Q-T Interval 544 ms    QTC Calculation (Bezet) 397 ms    Calculated P Axis 68 degrees    Calculated R Axis -49 degrees    Calculated T Axis 102 degrees    Diagnosis       Sinus rhythm with complete heart block  Left axis deviation  Left bundle branch block  Abnormal ECG  Confirmed by Benji Clark MD, [facility] ([id number]) on 6/14/2018 10:20:04 PM     CBC WITH AUTOMATED DIFF    Collection Time: 06/14/18  5:03 PM   Result Value Ref Range    WBC 5.9 4.6 - 13.2 K/uL    RBC 3.92 (L) 4.20 - 5.30 M/uL    HGB 12.2 12.0 - 16.0 g/dL    HCT 36.3 35.0 - 45.0 %    MCV 92.6 74.0 - 97.0 FL    MCH 31.1 24.0 - 34.0 PG    MCHC 33.6 31.0 - 37.0 g/dL    RDW 14.7 (H) 11.6 - 14.5 %    PLATELET 422 265 - 131 K/uL    MPV 10.9 9.2 - 11.8 FL    NEUTROPHILS 64 40 - 73 %    LYMPHOCYTES 22 21 - 52 %    MONOCYTES 11 (H) 3 - 10 %    EOSINOPHILS 3 0 - 5 %    BASOPHILS 0 0 - 2 %    ABS. NEUTROPHILS 3.8 1.8 - 8.0 K/UL    ABS. LYMPHOCYTES 1.3 0.9 - 3.6 K/UL    ABS. MONOCYTES 0.6 0.05 - 1.2 K/UL    ABS. EOSINOPHILS 0.2 0.0 - 0.4 K/UL    ABS.  BASOPHILS 0.0 0.0 - 0.06 K/UL    DF AUTOMATED     PROTHROMBIN TIME + INR    Collection Time: 06/14/18  5:03 PM   Result Value Ref Range    Prothrombin time 13.6 11.5 - 15.2 sec    INR 1.1 0.8 - 1.2     PTT    Collection Time: 06/14/18  5:03 PM   Result Value Ref Range    aPTT 30.3 23.0 - 36.4 SEC   LIPASE    Collection Time: 06/14/18  5:55 PM   Result Value Ref Range    Lipase 73 73 - 393 U/L   CARDIAC PANEL,(CK, CKMB & TROPONIN)    Collection Time: 06/14/18  5:55 PM   Result Value Ref Range    CK 26 26 - 192 U/L    CK - MB <1.0 <3.6 ng/ml    CK-MB Index  0.0 - 4.0 %     CALCULATION NOT PERFORMED WHEN RESULT IS BELOW LINEAR LIMIT    Troponin-I, Qt. <0.02 0.00 - 0.06 NG/ML   MAGNESIUM    Collection Time: 06/14/18  5:55 PM   Result Value Ref Range    Magnesium 2.2 1.6 - 2.6 mg/dL   METABOLIC PANEL, COMPREHENSIVE    Collection Time: 06/14/18  5:55 PM   Result Value Ref Range    Sodium 135 (L) 136 - 145 mmol/L    Potassium 5.7 (H) 3.5 - 5.5 mmol/L    Chloride 103 100 - 108 mmol/L    CO2 25 21 - 32 mmol/L    Anion gap 7 3.0 - 18 mmol/L    Glucose 97 74 - 99 mg/dL    BUN 23 (H) 7.0 - 18 MG/DL    Creatinine 1.49 (H) 0.6 - 1.3 MG/DL    BUN/Creatinine ratio 15 12 - 20      GFR est AA 40 (L) >60 ml/min/1.73m2    GFR est non-AA 33 (L) >60 ml/min/1.73m2    Calcium 8.3 (L) 8.5 - 10.1 MG/DL    Bilirubin, total 0.6 0.2 - 1.0 MG/DL    ALT (SGPT) 12 (L) 13 - 56 U/L    AST (SGOT) 14 (L) 15 - 37 U/L    Alk.  phosphatase 66 45 - 117 U/L    Protein, total 6.5 6.4 - 8.2 g/dL    Albumin 3.0 (L) 3.4 - 5.0 g/dL    Globulin 3.5 2.0 - 4.0 g/dL    A-G Ratio 0.9 0.8 - 1.7     NT-PRO BNP    Collection Time: 06/14/18  5:55 PM   Result Value Ref Range    NT pro-BNP 8391 (H) 0 - 1800 PG/ML   POC CHEM8    Collection Time: 06/14/18  6:01 PM   Result Value Ref Range    CO2, POC 25 (H) 19 - 24 MMOL/L    Glucose, POC 94 74 - 106 MG/DL    BUN, POC 29 (H) 7 - 18 MG/DL    Creatinine, POC 1.4 (H) 0.6 - 1.3 MG/DL    GFRAA, POC 43 (L) >60 ml/min/1.73m2    GFRNA, POC 35 (L) >60 ml/min/1.73m2    Sodium,  (L) 136 - 145 MMOL/L    Potassium, POC 5.8 (H) 3.5 - 5.5 MMOL/L    Calcium, ionized (POC) 1.21 1.12 - 1.32 mmol/L    Chloride,  100 - 108 MMOL/L    Anion gap, POC 14 10 - 20      Hematocrit, POC 33 (L) 36 - 49 %    Hemoglobin, POC 11.2 (L) 12 - 16 G/DL   POTASSIUM    Collection Time: 06/15/18 12:42 AM   Result Value Ref Range    Potassium 5.1 3.5 - 5.5 mmol/L   MAGNESIUM    Collection Time: 06/15/18  3:55 AM   Result Value Ref Range    Magnesium 2.2 1.6 - 2.6 mg/dL   CBC WITH AUTOMATED DIFF    Collection Time: 06/15/18  3:55 AM   Result Value Ref Range    WBC 7.2 4.6 - 13.2 K/uL    RBC 3.94 (L) 4.20 - 5.30 M/uL    HGB 12.0 12.0 - 16.0 g/dL    HCT 36.4 35.0 - 45.0 %    MCV 92.4 74.0 - 97.0 FL    MCH 30.5 24.0 - 34.0 PG    MCHC 33.0 31.0 - 37.0 g/dL    RDW 14.6 (H) 11.6 - 14.5 %    PLATELET 691 890 - 957 K/uL    MPV 10.1 9.2 - 11.8 FL    NEUTROPHILS 79 (H) 40 - 73 %    LYMPHOCYTES 11 (L) 21 - 52 %    MONOCYTES 9 3 - 10 %    EOSINOPHILS 1 0 - 5 %    BASOPHILS 0 0 - 2 %    ABS. NEUTROPHILS 5.8 1.8 - 8.0 K/UL    ABS. LYMPHOCYTES 0.8 (L) 0.9 - 3.6 K/UL    ABS. MONOCYTES 0.6 0.05 - 1.2 K/UL    ABS. EOSINOPHILS 0.1 0.0 - 0.4 K/UL    ABS.  BASOPHILS 0.0 0.0 - 0.06 K/UL    DF AUTOMATED     PHOSPHORUS    Collection Time: 06/15/18  3:55 AM   Result Value Ref Range    Phosphorus 4.7 2.5 - 4.9 MG/DL   METABOLIC PANEL, BASIC    Collection Time: 06/15/18  3:55 AM   Result Value Ref Range    Sodium 137 136 - 145 mmol/L    Potassium 5.6 (H) 3.5 - 5.5 mmol/L    Chloride 105 100 - 108 mmol/L    CO2 21 21 - 32 mmol/L    Anion gap 11 3.0 - 18 mmol/L    Glucose 124 (H) 74 - 99 mg/dL    BUN 22 (H) 7.0 - 18 MG/DL    Creatinine 1.51 (H) 0.6 - 1.3 MG/DL    BUN/Creatinine ratio 15 12 - 20      GFR est AA 39 (L) >60 ml/min/1.73m2    GFR est non-AA 32 (L) >60 ml/min/1.73m2    Calcium 8.4 (L) 8.5 - 10.1 MG/DL   CALCIUM, IONIZED    Collection Time: 06/15/18  7:27 AM   Result Value Ref Range    Ionized Calcium 1.17 1.12 - 1.32 MMOL/L         Chemistry Recent Labs      06/15/18   0355  06/15/18   0042  06/14/18   1755   GLU  124*   --   97   NA  137   --   135*   K  5.6*  5.1  5.7*   CL  105   --   103   CO2  21   --   25   BUN  22*   --   23* CREA  1.51*   --   1.49*   CA  8.4*   --   8.3*   MG  2.2   --   2.2   PHOS  4.7   --    --    AGAP  11   --   7   BUCR  15   --   15   AP   --    --   66   TP   --    --   6.5   ALB   --    --   3.0*   GLOB   --    --   3.5   AGRAT   --    --   0.9        Lactic Acid No results found for: LAC  No results for input(s): LAC in the last 72 hours. Liver Enzymes Protein, total   Date Value Ref Range Status   06/14/2018 6.5 6.4 - 8.2 g/dL Final     Albumin   Date Value Ref Range Status   06/14/2018 3.0 (L) 3.4 - 5.0 g/dL Final     Globulin   Date Value Ref Range Status   06/14/2018 3.5 2.0 - 4.0 g/dL Final     A-G Ratio   Date Value Ref Range Status   06/14/2018 0.9 0.8 - 1.7   Final     AST (SGOT)   Date Value Ref Range Status   06/14/2018 14 (L) 15 - 37 U/L Final     Alk.  phosphatase   Date Value Ref Range Status   06/14/2018 66 45 - 117 U/L Final     Recent Labs      06/14/18   1755   TP  6.5   ALB  3.0*   GLOB  3.5   AGRAT  0.9   SGOT  14*   AP  66        CBC w/Diff Recent Labs      06/15/18   0355  06/14/18   1703   WBC  7.2  5.9   RBC  3.94*  3.92*   HGB  12.0  12.2   HCT  36.4  36.3   PLT  175  217   GRANS  79*  64   LYMPH  11*  22   EOS  1  3        Cardiac Enzymes Lab Results   Component Value Date/Time    CPK 26 06/14/2018 05:55 PM    CKMB <1.0 06/14/2018 05:55 PM    CKND1  06/14/2018 05:55 PM     CALCULATION NOT PERFORMED WHEN RESULT IS BELOW LINEAR LIMIT    Brian Miguel <0.02 06/14/2018 05:55 PM        BNP No results found for: BNP, BNPP, XBNPT     Coagulation Recent Labs      06/14/18   1703   PTP  13.6   INR  1.1   APTT  30.3         Thyroid  No results found for: T4, T3U, TSH, TSHEXT, TSHEXT    No results found for: T4     Urinalysis Lab Results   Component Value Date/Time    Color YELLOW 06/01/2018 11:38 AM    Appearance CLEAR 06/01/2018 11:38 AM    Specific gravity 1.005 06/01/2018 11:38 AM    pH (UA) 6.0 06/01/2018 11:38 AM    Protein NEGATIVE  06/01/2018 11:38 AM    Glucose NEGATIVE  06/01/2018 11:38 AM Ketone NEGATIVE  06/01/2018 11:38 AM    Bilirubin NEGATIVE  06/01/2018 11:38 AM    Urobilinogen 0.2 06/01/2018 11:38 AM    Nitrites NEGATIVE  06/01/2018 11:38 AM    Leukocyte Esterase TRACE (A) 06/01/2018 11:38 AM    Epithelial cells FEW 06/01/2018 11:38 AM    Bacteria NEGATIVE  06/01/2018 11:38 AM    WBC 0 to 3 06/01/2018 11:38 AM    RBC 0 to 3 06/01/2018 11:38 AM        Micro  No results for input(s): SDES, CULT in the last 72 hours. No results for input(s): CULT in the last 72 hours. ABG No results for input(s): PHI, PHI, POC2, PCO2I, PO2, PO2I, HCO3, HCO3I, FIO2, FIO2I in the last 72 hours. PFT       Ultrasound       LE Doppler       ECHO       CT (Most Recent) (CT chest reviewed by me)   Results from Hospital Encounter encounter on 04/24/18   CT HEAD WO CONT   Narrative EXAM: CT head    INDICATION: Traumatic head injury. COMPARISON: 3/21/2018. TECHNIQUE: Axial CT imaging of the head was performed without intravenous  contrast.    One or more dose reduction techniques were used on this CT: automated exposure  control, adjustment of the mAs and/or kVp according to patient's size, and  iterative reconstruction techniques. The specific techniques utilized on this CT  exam have been documented in the patient's electronic medical record.  _______________    FINDINGS:    BRAIN AND POSTERIOR FOSSA: There is no intracranial hemorrhage, mass effect, or  midline shift. There is a moderate degree of sulcal enlargement and ventricular  dilatation consistent with cortical atrophy. There is hypoattenuation of the  periventricular white matter, which is nonspecific but most likely represents  changes from chronic microangiopathy. EXTRA-AXIAL SPACES AND MENINGES: There are no abnormal extra-axial fluid  collections. CALVARIUM: Intact. SINUSES: Clear. OTHER: None.    _______________         Impression IMPRESSION:    1.  No acute intracranial process, specifically, no evidence of intracranial  hemorrhage, mass effect, or midline shift. 2. Senescent changes of the brain including cortical atrophy and findings most  suggestive of chronic microvascular ischemia. Please note that CT may be negative in the setting of acute infarction. XR (Most Recent). CXR  reviewed by me and compared with previous CXR   Results from Hospital Encounter encounter on 06/14/18   XR CHEST PORT   Narrative ---------------------------------------------------------------------------  <<<<<<<<< [facility] >>>>>>>>>   ---------------------------------------------------------------------------    CLINICAL HISTORY:  Pacemaker placement. COMPARISON EXAMINATIONS:  Previous of the same day. ---  SINGLE FRONTAL VIEW OF THE CHEST  ---    The cardiomediastinal silhouette is stable. Mild diffuse increased markings are  again seen similar to previous. There is no focal consolidation or significant  pleural effusion. A pacer lead is seen extending from the abdomen with tip  overlying the mid inferior heart. No significant osseous abnormalities are  identified.        --------------       Impression Impression:  --------------    Pacer lead in place. Mild diffuse increased social markings were seen previously and may be chronic. There is no focal consolidation or pleural effusion. No pneumothorax.                Matilda Aguilar MD  6/15/2018

## 2018-06-15 NOTE — ROUTINE PROCESS
TRANSFER - IN REPORT:    Verbal report received from Jerome Jones RN (name) on Flavia Kc  being received from Cath Lab (unit) for routine progression of care      Report consisted of patients Situation, Background, Assessment and   Recommendations(SBAR). Information from the following report(s) SBAR, Kardex, Intake/Output, MAR and Recent Results was reviewed with the receiving nurse. Opportunity for questions and clarification was provided.

## 2018-06-15 NOTE — CONSULTS
[facility] Consult Note      Patient: Lanae Skiff MRN: [id number]  SSN: [id number]    YOB: 1924  Age: 80 y.o. Sex: female    Date of Consultation: 06/14/2018  Referring Physician: Gustavo Torres MD  Reason for Consultation: Complete heart block    Chief complain: Shortness of breath    HPI: 22-year-old female brought to the emergency room by EMS from [facility] on breath complaining of shortness of breath noted by the staff. Patient does not recall any symptoms. Patient had  Recent visit to the emergency room with dizziness/syncope. She is minimally ambulatory and uses walker for ambulation. She is complaining of shortness of breath on exertion. She denies any chest pain, shortness of breath, dizziness, palpitation at this time.     Past Medical History:   Diagnosis Date    Arthritis     Cancer [facility])     right breast    Cardiac murmur     Fall     GERD (gastroesophageal reflux disease)     GERD (gastroesophageal reflux disease)     Gout     Hyperlipidemia     Hyperlipidemia     Hypertension     Osteomalacia     Risk for falls      Past Surgical History:   Procedure Laterality Date    HX APPENDECTOMY  1943    NEUROLOGICAL PROCEDURE UNLISTED  2010    lower back surgery     Current Facility-Administered Medications   Medication Dose Route Frequency    famotidine (PF) (PEPCID) 20 mg in sodium chloride 0.9% 10 mL injection  20 mg IntraVENous Q12H    [START ON 6/15/2018] fluticasone (FLONASE) 50 mcg/actuation nasal spray 2 Spray  2 Spray Both Nostrils DAILY    [START ON 6/15/2018] ascorbic acid (vitamin C) (VITAMIN C) tablet 1,000 mg  1,000 mg Oral DAILY    Electrolyte Replacement Protocol - Potassium Renal Dosing  1 Each Other PRN    Electrolyte Replacement Protocol - Magnesium   1 Each Other PRN    Electrolyte Replacement Protocol - Phosphorus Renal Dosing  1 Each Other PRN    Electrolyte Replacement Protocol - Calcium   1 Each Other PRN    sodium chloride (NS) flush 5-10 mL 5-10 mL IntraVENous Q8H    sodium chloride (NS) flush 5-10 mL  5-10 mL IntraVENous PRN    hydrALAZINE (APRESOLINE) 20 mg/mL injection 20 mg  20 mg IntraVENous Q6H PRN    carboxymethylcellulose sodium (REFRESH PLUS) 0.5 % ophthalmic solution 1 Drop  1 Drop Both Eyes PRN    sodium chloride (NS) flush 5-10 mL  5-10 mL IntraVENous Q8H    sodium chloride (NS) flush 5-10 mL  5-10 mL IntraVENous PRN       Allergies and Intolerances: Allergies   Allergen Reactions    Advil [Ibuprofen] Other (comments)     Upset stomach       Family History:   History reviewed. No pertinent family history. Social History:   She  reports that she has never smoked. She has never used smokeless tobacco.  She  reports that she does not drink alcohol. Review of Systems:     Gen: No fever, chills, malaise, weight loss/gain. Heent: No headache, rhinorrhea, epistaxis, ear pain, hearing loss, sinus pain, neck pain/stiffness, sore throat. Heart: Positive shortness of breath, No chest pain, palpitations, pnd, or orthopnea. Resp: No cough, hemoptysis, wheezing and dyspnea. GI: No nausea, vomiting, diarrhea, constipation, melena or hematochezia. : No urinary obstruction, dysuria or hematuria. Derm: No rash, new skin lesion or pruritis. Musc/skeletal: no bone or joint complains. Vasc: No edema, cyanosis or claudication. Endo: No heat/cold intolerance, no polyuria,polydipsia or polyphagia. Neuro: No unilateral weakness, numbness, tingling. No seizures. Heme: No easy bruising or bleeding.       Physical:   Patient Vitals for the past 6 hrs:   Temp Pulse Resp BP SpO2   06/14/18 2215 - (!) 53 - 182/51 -   06/14/18 1940 98 °F (36.7 °C) (!) 51 16 (!) 191/121 98 %   06/14/18 1800 - 62 18 (!) 192/27 -   06/14/18 1745 - (!) 44 28 (!) 194/30 -   06/14/18 1730 - (!) 45 22 (!) 203/39 95 %   06/14/18 1715 - (!) 48 15 (!) 209/51 97 %   06/14/18 1703 97.7 °F (36.5 °C) (!) 35 20 (!) 219/45 95 %         Exam:   General Appearance: Comfortable, not using accessory muscles of respiration. HEENT: MELANI. HEAD: Atraumatic  NECK: No JVD, no thyroidomeglay. CAROTIDS: No bruit  LUNGS: Clear bilaterally. HEART: O9,G1 soft, 3/6 systolic murmur in aortic area, no pericardial rub. ABD: Non-tender, BS Audible    EXT: No edema, and no cyanosis. VASCULAR EXAM: Pulses are intact. PSYCHIATRIC EXAM: Mood is appropriate. MUSCULOSKELETAL: Grossly no joint deformity.   NEUROLOGICAL: AAO times 3, Motor and sensory sytem intact    Review of Data:   LABS:   Lab Results   Component Value Date/Time    WBC 5.9 06/14/2018 05:03 PM    HGB 12.2 06/14/2018 05:03 PM    HCT 36.3 06/14/2018 05:03 PM    PLATELET 161 31/75/5939 05:03 PM     Lab Results   Component Value Date/Time    Sodium 135 (L) 06/14/2018 05:55 PM    Potassium 5.7 (H) 06/14/2018 05:55 PM    Chloride 103 06/14/2018 05:55 PM    CO2 25 06/14/2018 05:55 PM    Glucose 97 06/14/2018 05:55 PM    BUN 23 (H) 06/14/2018 05:55 PM    Creatinine 1.49 (H) 06/14/2018 05:55 PM     No results found for: CHOL, CHOLX, CHLST, CHOLV, HDL, LDL, LDLC, DLDLP, TGLX, TRIGL, TRIGP  No results found for: GPT  Lab Results   Component Value Date/Time    Hemoglobin A1c 5.8 09/03/2015 06:10 AM         Cardiology Procedures:   Results for orders placed or performed during the hospital encounter of 06/14/18   EKG, 12 LEAD, INITIAL   Result Value Ref Range    Ventricular Rate 32 BPM    Atrial Rate 90 BPM    QRS Duration 140 ms    Q-T Interval 544 ms    QTC Calculation (Bezet) 397 ms    Calculated P Axis 68 degrees    Calculated R Axis -49 degrees    Calculated T Axis 102 degrees    Diagnosis       Sinus rhythm with complete heart block  Left axis deviation  Left bundle branch block  Abnormal ECG  Confirmed by Laurent Cabello MD, Flaquita Rodríguez ([id number]) on 6/14/2018 10:20:04 PM             Impression / Plan:    Patient Active Problem List   Diagnosis Code    Breast cancer ([facility] [address] C50.919    HTN (hypertension) I10    Dyspnea R06.00    AV block, 3rd degree ([facility]) I44.2    Cardiac murmur R01.1    Hyperlipidemia E78.5    Hypertension I10    Risk for falls Z91.81    GERD (gastroesophageal reflux disease) K21.9    Hyperkalemia E87.5    CKD (chronic kidney disease) stage 3, GFR 30-59 ml/min N253     80year-old female brought to the emergency room with shortness of breath. EKG revealed sinus rhythm with complete heart block, lEFT bundle branch block. She had recent visit to emergency room with dizziness/syncope at that time EKG revealed sinus rhythm with 2:1 AV block, LEFT bundle branch block. Blood pressure is elevated. Patient recalls feeling dizziness off and on. Stat lab ordered. Her potassium is mildly elevated, she is on potasium supplement. In view of complete heart block advise about temporary pacemaker placement. All risks, benefits and alternatives explained to patient and she verbally understood. Patient agrees for temporary pacemaker placement. Called patient's son and discussed with him about patient condition. Patient's son agreed for procedure. Proceeded for temporary pacemaker placement. Further recommendation to follow. 40 minutes of critical care time spent in the direct evaluation and treatment of this high risk patient. The reason for providing this level of medical care for this critically ill patient was due a critical illness that impaired one or more vital organ systems such that there was a high probability of imminent or life threatening deterioration in the patients condition. This care involved high complexity decision making to assess, manipulate, and support vital system functions, to treat this degree vital organ system failure and to prevent further life threatening deterioration of the patients condition.           Signed By: Alexandrea Fuentes MD     June 14, 2018

## 2018-06-15 NOTE — PROGRESS NOTES
Hospitalist Progress Note-critical care note     Patient: Soila Ruano MRN: [id number]  CSN: [id number]    YOB: 1924  Age: 80 y.o. Sex: female    DOA: 6/14/2018 LOS:  LOS: 1 day            Chief complaint: av block  htn , ckd 3    Assessment/Plan         Hospital Problems  Date Reviewed: 6/15/2018          Codes Class Noted POA    HTN (hypertension) ICD-10-CM: I10  ICD-9-CM: 401.9  6/14/2018 Unknown        Dyspnea ICD-10-CM: R06.00  ICD-9-CM: 786.09  6/14/2018 Unknown        * (Principal)AV block, 3rd degree (HCC) ICD-10-CM: I44.2  ICD-9-CM: 426.0  6/14/2018 Unknown        Cardiac murmur ICD-10-CM: R01.1  ICD-9-CM: 785.2  6/14/2018 Unknown        Hyperlipidemia ICD-10-CM: E78.5  ICD-9-CM: 272.4  6/14/2018 Unknown        Hypertension ICD-10-CM: I10  ICD-9-CM: 401.9  6/14/2018 Unknown        Risk for falls ICD-10-CM: Z91.81  ICD-9-CM: V15.88  6/14/2018 Unknown        GERD (gastroesophageal reflux disease) ICD-10-CM: K21.9  ICD-9-CM: 530.81  6/14/2018 Unknown        Hyperkalemia ICD-10-CM: E87.5  ICD-9-CM: 276.7  6/14/2018 Unknown        CKD (chronic kidney disease) stage 3, GFR 30-59 ml/min ICD-10-CM: N18.3  ICD-9-CM: 585.3  6/14/2018 Unknown              3 degree av block   Pace maker placed today per Dr. Cornelius Broussard   Echo done    HTN -Hypertensive Crisis in the setting of complete heart block, requiring IV Hydralazine-cdmp   Thiazide started and will add nrovasc , continue hydralazine iv prn , will adjust according to echo     , Risk of fall   Fall precaution   Hyperkalemia   Resolved per kaexylate and thiazide     ckd3  Stable     Hx of breast cancer   gerd   ppi     Remained  ICU      Subjective: feel ok   Review of systems:  Unable to obtain due to medication from procedure .      Vital signs/Intake and Output:  Visit Vitals    BP (!) 212/86 (BP 1 Location: Right arm, BP Patient Position: At rest;Supine)    Pulse 87    Temp 97.3 °F (36.3 °C)    Resp 19    Ht 5' 4\" (1.626 m)    Wt 65.1 kg (143 lb 9.6 oz)    SpO2 97%    BMI 24.65 kg/m2     Current Shift:  06/15 0701 - 06/15 1900  In: -   Out: 400 [Urine:400]  Last three shifts:  06/13 1901 - 06/15 0700  In: -   Out: 1150 [Urine:1150]    Physical Exam:  General: WD, WN. Alert, cooperative, no acute distress    HEENT: NC, Atraumatic. PERRLA, anicteric sclerae. Lungs: CTA Bilaterally. No Wheezing/Rhonchi/Rales. Wound covered with gauze,  Heart:  Regular  rhythm,  + murmur, No Rubs, No Gallops  Abdomen: Soft, Non distended, Non tender.  +Bowel sounds,   Extremities: No c/c/e  Psych:   Not anxious or agitated. Neurologic:  No acute neurological deficit. Sleepy           Labs: Results:       Chemistry Recent Labs      06/15/18   1323  06/15/18   0355  06/15/18   0042  06/14/18   1755   GLU  127*  124*   --   97   NA  137  137   --   135*   K  4.7  5.6*  5.1  5.7*   CL  104  105   --   103   CO2  24  21   --   25   BUN  21*  22*   --   23*   CREA  1.50*  1.51*   --   1.49*   CA  8.5  8.4*   --   8.3*   AGAP  9  11   --   7   BUCR  14  15   --   15   AP   --    --    --   66   TP   --    --    --   6.5   ALB   --    --    --   3.0*   GLOB   --    --    --   3.5   AGRAT   --    --    --   0.9      CBC w/Diff Recent Labs      06/15/18   0355  06/14/18   1703   WBC  7.2  5.9   RBC  3.94*  3.92*   HGB  12.0  12.2   HCT  36.4  36.3   PLT  175  217   GRANS  79*  64   LYMPH  11*  22   EOS  1  3      Cardiac Enzymes Recent Labs      06/14/18   1755   CPK  26   CKND1  CALCULATION NOT PERFORMED WHEN RESULT IS BELOW LINEAR LIMIT      Coagulation Recent Labs      06/14/18   1703   PTP  13.6   INR  1.1   APTT  30.3       Lipid Panel No results found for: CHOL, CHOLPOCT, CHOLX, CHLST, CHOLV, 872190, HDL, LDL, LDLC, DLDLP, 705164, VLDLC, VLDL, TGLX, TRIGL, TRIGP, TGLPOCT, CHHD, CHHDX   BNP No results for input(s): BNPP in the last 72 hours.    Liver Enzymes Recent Labs      06/14/18   1755   TP  6.5   ALB  3.0*   AP  66   SGOT  14*      Thyroid Studies Lab Results Component Value Date/Time    TSH 3.16 06/15/2018 03:55 AM        Procedures/imaging: see electronic medical records for all procedures/Xrays and details which were not copied into this note but were reviewed prior to creation of Shweta Doss MD

## 2018-06-15 NOTE — PROGRESS NOTES
attempted an initial visit of the patient, who was sleeping. Spiritual Care materials left at bedside. Chaplains will continue to follow and will provide pastoral care as needed or requested. [address], M.Div.   Board Certified   [phone] - Office

## 2018-06-15 NOTE — CDMP QUERY
Please clarify if this patient is being treated/managed for:    => Hypertensive Crisis in the setting of complete heart block, requiring IV Hydralazine  =>Other Explanation of clinical findings  =>Unable to Determine (no explanation of clinical findings)    The medical record reflects the following:    Risk: HTN, AV block,     Clinical Indicators: 79yo female presented w/ dyspnea. Per EMS o2 sats 93% on RA. In ER she was found to be in complete heart block. BP was noted to be significantly elevated, 219/45. Treatment: IV hydralazine, Cardiology consult, transvenous temporary pacer placed then converted to permanent pacemaker. Monitor in ICU and maintain SBP <180, Iv hydralazine prn    For your reference: Hypertensive crisis  occurs when blood pressure elevates rapidly and severely enough to potentially cause organ damage. Patients may present with symptoms of:    acute headache,  shortness of breath,  epistaxis,  marked anxiety/confusion,  nausea/vomiting    Please clarify and document your clinical opinion in the progress notes and discharge summary including the definitive and/or presumptive diagnosis, (suspected or probable), related to the above clinical findings. Please include clinical findings supporting your diagnosis. If you DECLINE this query or would like to communicate with [facility], please utilize the \"[employer] message box\" at the TOP of the Progress Note on the right.       Thank you,  [facility], [facility]

## 2018-06-15 NOTE — PROGRESS NOTES
Cardiology Progress Note        Patient: Corey Brothers        Sex: female          DOA: 6/14/2018  YOB: 1924      Age:  80 y.o.        LOS:  LOS: 1 day    Patient seen and examined, chart reviewed  Assessment/Plan     Patient Active Problem List   Diagnosis Code         HTN (hypertension) I10    Dyspnea R06.00    AV block, 3rd degree ([facility] [address] I44.2         Hyperlipidemia E78.5    Hypertension I10    Risk for falls Z91.81    GERD (gastroesophageal reflux disease) K21.9    Hyperkalemia E87.5    CKD (chronic kidney disease) stage 3, GFR 30-59 ml/min N18.3      Complete heart blocker s/p Temporary followed by permanent pacemaker placement   Mild to moderate aortic stenosis  Mild mitral stenosis    EKG done today reviewed: EKG revealed Sinus rhythm, A sensed V paced  Plan:    Continue HCTZ  Continue Hydralazine IV PRN  Plan discussed with Dr.Anil Way                  Subjective:    cc:  Denies any chest pain or shortness of breath       REVIEW OF SYSTEMS:     General: No fevers or chills. Cardiovascular: No chest pain,No palpitations, No orthopnea, No PND, No leg swelling, No claudication  Pulmonary: No dyspnea. Gastrointestinal: No nausea, vomiting, bleeding  Neurology: No Dizziness    Objective:      Visit Vitals    /79    Pulse 92    Temp 98.3 °F (36.8 °C)    Resp 28    Ht 5' 4\" (1.626 m)    Wt 65.1 kg (143 lb 9.6 oz)    SpO2 98%    BMI 24.65 kg/m2     Body mass index is 24.65 kg/(m^2). Physical Exam:  General Appearance: Comfortable, not using accessory muscles of respiration. HEENT: MELANI. HEAD: Atraumatic  NECK: No JVD, no thyroidomeglay. CAROTIDS: No bruit  LUNGS: Clear bilaterally. HEART: S1+S2 soft, 3/6 murmur in aortic area, no pericardial rub. ABD: Non-tender, BS Audible    EXT: No edema, and no cyanosis.  Right groin: No hematoma, no ecchymosis, no swelling, no tenderness  NEUROLOGICAL: Alert, follows verbal commands  Medication:  Current Facility-Administered Medications   Medication Dose Route Frequency    sodium chloride (NS) flush 5-10 mL  5-10 mL IntraVENous Q8H    sodium chloride (NS) flush 5-10 mL  5-10 mL IntraVENous PRN    acetaminophen (TYLENOL) tablet 650 mg  650 mg Oral Q4H PRN    ceFAZolin (ANCEF) 2 g/20 mL in sterile water IV syringe  2 g IntraVENous Q8H    promethazine (PHENERGAN) with saline injection 12.5 mg  12.5 mg IntraVENous Q6H PRN    hydroCHLOROthiazide (HYDRODIURIL) tablet 25 mg  25 mg Oral DAILY    amLODIPine (NORVASC) tablet 5 mg  5 mg Oral DAILY    famotidine (PF) (PEPCID) 20 mg in sodium chloride 0.9% 10 mL injection  20 mg IntraVENous Q12H    fluticasone (FLONASE) 50 mcg/actuation nasal spray 2 Spray  2 Spray Both Nostrils DAILY    ascorbic acid (vitamin C) (VITAMIN C) tablet 1,000 mg  1,000 mg Oral DAILY    Electrolyte Replacement Protocol - Potassium Renal Dosing  1 Each Other PRN    Electrolyte Replacement Protocol - Magnesium   1 Each Other PRN    Electrolyte Replacement Protocol - Phosphorus Renal Dosing  1 Each Other PRN    Electrolyte Replacement Protocol - Calcium   1 Each Other PRN    sodium chloride (NS) flush 5-10 mL  5-10 mL IntraVENous Q8H    sodium chloride (NS) flush 5-10 mL  5-10 mL IntraVENous PRN    hydrALAZINE (APRESOLINE) 20 mg/mL injection 20 mg  20 mg IntraVENous Q6H PRN    carboxymethylcellulose sodium (REFRESH PLUS) 0.5 % ophthalmic solution 1 Drop  1 Drop Both Eyes PRN               Lab/Data Reviewed:       Recent Labs      06/15/18   0355  06/14/18   1703   WBC  7.2  5.9   HGB  12.0  12.2   HCT  36.4  36.3   PLT  175  217     Recent Labs      06/15/18   1323  06/15/18   0355  06/15/18   0042  06/14/18   1755   NA  137  137   --   135*   K  4.7  5.6*  5.1  5.7*   CL  104  105   --   103   CO2  24  21   --   25   GLU  127*  124*   --   97   BUN  21*  22*   --   23*   CREA  1.50*  1.51*   --   1.49*   CA  8.5  8.4*   --   8.3*       Signed By: Warner Rose Alba Araujo MD     Demetria 15, 2018

## 2018-06-15 NOTE — PROGRESS NOTES
Discussed with Dr. Zachary Leger.   Plan to proceed with permanent pacemaker tomorrow Andrei Jurado MD

## 2018-06-15 NOTE — ROUTINE PROCESS
TRANSFER - OUT REPORT:    Verbal report given to [id number] RN(name) on Monika Ruiz  being transferred to ICU(unit) for routine progression of care       Report consisted of patients Situation, Background, Assessment and   Recommendations(SBAR). Information from the following report(s) Procedure Summary, Recent Results, Med Rec Status and Cardiac Rhythm PACED was reviewed with the receiving nurse. Lines:   Peripheral IV 06/14/18 Left Antecubital (Active)   Site Assessment Clean, dry, & intact 6/15/2018  4:15 AM   Phlebitis Assessment 0 6/15/2018  4:15 AM   Infiltration Assessment 0 6/15/2018  4:15 AM   Dressing Status Clean, dry, & intact 6/15/2018  4:15 AM   Dressing Type Tape;Transparent 6/15/2018  4:15 AM   Hub Color/Line Status Pink;Capped 6/15/2018  4:15 AM   Alcohol Cap Used Yes 6/15/2018 12:00 AM        Opportunity for questions and clarification was provided.       Patient transported with:   Monitor  Tech

## 2018-06-16 ENCOUNTER — APPOINTMENT (OUTPATIENT)
Dept: GENERAL RADIOLOGY | Age: 83
DRG: 243 | End: 2018-06-16
Attending: INTERNAL MEDICINE
Payer: MEDICARE

## 2018-06-16 LAB
ANION GAP SERPL CALC-SCNC: 13 MMOL/L (ref 3–18)
ANION GAP SERPL CALC-SCNC: 7 MMOL/L (ref 3–18)
ATRIAL RATE: 81 BPM
BASOPHILS # BLD: 0 K/UL (ref 0–0.06)
BASOPHILS NFR BLD: 0 % (ref 0–2)
BUN SERPL-MCNC: 24 MG/DL (ref 7–18)
BUN SERPL-MCNC: 28 MG/DL (ref 7–18)
BUN/CREAT SERPL: 14 (ref 12–20)
BUN/CREAT SERPL: 17 (ref 12–20)
CALCIUM SERPL-MCNC: 8.1 MG/DL (ref 8.5–10.1)
CALCIUM SERPL-MCNC: 8.3 MG/DL (ref 8.5–10.1)
CALCULATED P AXIS, ECG09: 72 DEGREES
CALCULATED R AXIS, ECG10: -62 DEGREES
CALCULATED T AXIS, ECG11: 86 DEGREES
CHLORIDE SERPL-SCNC: 102 MMOL/L (ref 100–108)
CHLORIDE SERPL-SCNC: 104 MMOL/L (ref 100–108)
CO2 SERPL-SCNC: 23 MMOL/L (ref 21–32)
CO2 SERPL-SCNC: 26 MMOL/L (ref 21–32)
CREAT SERPL-MCNC: 1.68 MG/DL (ref 0.6–1.3)
CREAT SERPL-MCNC: 1.77 MG/DL (ref 0.6–1.3)
DIAGNOSIS, 93000: NORMAL
DIFFERENTIAL METHOD BLD: ABNORMAL
EOSINOPHIL # BLD: 0.1 K/UL (ref 0–0.4)
EOSINOPHIL NFR BLD: 2 % (ref 0–5)
ERYTHROCYTE [DISTWIDTH] IN BLOOD BY AUTOMATED COUNT: 14.8 % (ref 11.6–14.5)
GLUCOSE SERPL-MCNC: 148 MG/DL (ref 74–99)
GLUCOSE SERPL-MCNC: 94 MG/DL (ref 74–99)
HCT VFR BLD AUTO: 36.6 % (ref 35–45)
HGB BLD-MCNC: 12 G/DL (ref 12–16)
LYMPHOCYTES # BLD: 1.2 K/UL (ref 0.9–3.6)
LYMPHOCYTES NFR BLD: 22 % (ref 21–52)
MAGNESIUM SERPL-MCNC: 2.3 MG/DL (ref 1.6–2.6)
MCH RBC QN AUTO: 30.7 PG (ref 24–34)
MCHC RBC AUTO-ENTMCNC: 32.8 G/DL (ref 31–37)
MCV RBC AUTO: 93.6 FL (ref 74–97)
MONOCYTES # BLD: 0.7 K/UL (ref 0.05–1.2)
MONOCYTES NFR BLD: 12 % (ref 3–10)
NEUTS SEG # BLD: 3.7 K/UL (ref 1.8–8)
NEUTS SEG NFR BLD: 64 % (ref 40–73)
P-R INTERVAL, ECG05: 158 MS
PLATELET # BLD AUTO: 163 K/UL (ref 135–420)
PMV BLD AUTO: 10.1 FL (ref 9.2–11.8)
POTASSIUM SERPL-SCNC: 4.3 MMOL/L (ref 3.5–5.5)
POTASSIUM SERPL-SCNC: 4.3 MMOL/L (ref 3.5–5.5)
Q-T INTERVAL, ECG07: 444 MS
QRS DURATION, ECG06: 152 MS
QTC CALCULATION (BEZET), ECG08: 515 MS
RBC # BLD AUTO: 3.91 M/UL (ref 4.2–5.3)
SODIUM SERPL-SCNC: 135 MMOL/L (ref 136–145)
SODIUM SERPL-SCNC: 140 MMOL/L (ref 136–145)
VENTRICULAR RATE, ECG03: 81 BPM
WBC # BLD AUTO: 5.7 K/UL (ref 4.6–13.2)

## 2018-06-16 PROCEDURE — 97162 PT EVAL MOD COMPLEX 30 MIN: CPT

## 2018-06-16 PROCEDURE — 83735 ASSAY OF MAGNESIUM: CPT | Performed by: HOSPITALIST

## 2018-06-16 PROCEDURE — 74011250636 HC RX REV CODE- 250/636: Performed by: INTERNAL MEDICINE

## 2018-06-16 PROCEDURE — 36415 COLL VENOUS BLD VENIPUNCTURE: CPT | Performed by: HOSPITALIST

## 2018-06-16 PROCEDURE — 80048 BASIC METABOLIC PNL TOTAL CA: CPT | Performed by: HOSPITALIST

## 2018-06-16 PROCEDURE — 65660000000 HC RM CCU STEPDOWN

## 2018-06-16 PROCEDURE — 77030037878 HC DRSG MEPILEX >48IN BORD MOLN -B

## 2018-06-16 PROCEDURE — 97530 THERAPEUTIC ACTIVITIES: CPT

## 2018-06-16 PROCEDURE — 73610 X-RAY EXAM OF ANKLE: CPT

## 2018-06-16 PROCEDURE — 77010033678 HC OXYGEN DAILY

## 2018-06-16 PROCEDURE — 77030032490 HC SLV COMPR SCD KNE COVD -B

## 2018-06-16 PROCEDURE — 74011000250 HC RX REV CODE- 250: Performed by: HOSPITALIST

## 2018-06-16 PROCEDURE — 74011250637 HC RX REV CODE- 250/637: Performed by: INTERNAL MEDICINE

## 2018-06-16 PROCEDURE — 74011250637 HC RX REV CODE- 250/637: Performed by: HOSPITALIST

## 2018-06-16 PROCEDURE — 85025 COMPLETE CBC W/AUTO DIFF WBC: CPT | Performed by: HOSPITALIST

## 2018-06-16 RX ORDER — AMLODIPINE BESYLATE 2.5 MG/1
2.5 TABLET ORAL DAILY
Status: DISCONTINUED | OUTPATIENT
Start: 2018-06-17 | End: 2018-06-17

## 2018-06-16 RX ORDER — SODIUM CHLORIDE 9 MG/ML
75 INJECTION, SOLUTION INTRAVENOUS CONTINUOUS
Status: DISCONTINUED | OUTPATIENT
Start: 2018-06-16 | End: 2018-06-16

## 2018-06-16 RX ORDER — HEPARIN SODIUM 5000 [USP'U]/ML
5000 INJECTION, SOLUTION INTRAVENOUS; SUBCUTANEOUS EVERY 8 HOURS
Status: DISCONTINUED | OUTPATIENT
Start: 2018-06-16 | End: 2018-06-18 | Stop reason: HOSPADM

## 2018-06-16 RX ADMIN — FAMOTIDINE 20 MG: 10 INJECTION, SOLUTION INTRAVENOUS at 22:27

## 2018-06-16 RX ADMIN — HYDROCHLOROTHIAZIDE 25 MG: 25 TABLET ORAL at 08:20

## 2018-06-16 RX ADMIN — FLUTICASONE PROPIONATE 2 SPRAY: 50 SPRAY, METERED NASAL at 08:21

## 2018-06-16 RX ADMIN — AMLODIPINE BESYLATE 5 MG: 5 TABLET ORAL at 08:21

## 2018-06-16 RX ADMIN — Medication 10 ML: at 22:27

## 2018-06-16 RX ADMIN — FAMOTIDINE 20 MG: 10 INJECTION, SOLUTION INTRAVENOUS at 08:21

## 2018-06-16 RX ADMIN — ACETAMINOPHEN 650 MG: 325 TABLET ORAL at 11:30

## 2018-06-16 RX ADMIN — Medication 10 ML: at 15:08

## 2018-06-16 RX ADMIN — SODIUM CHLORIDE 75 ML/HR: 900 INJECTION, SOLUTION INTRAVENOUS at 11:36

## 2018-06-16 RX ADMIN — HEPARIN SODIUM 5000 UNITS: 5000 INJECTION, SOLUTION INTRAVENOUS; SUBCUTANEOUS at 22:27

## 2018-06-16 RX ADMIN — HEPARIN SODIUM 5000 UNITS: 5000 INJECTION, SOLUTION INTRAVENOUS; SUBCUTANEOUS at 15:10

## 2018-06-16 RX ADMIN — Medication 1000 MG: at 08:22

## 2018-06-16 NOTE — PROGRESS NOTES
0730  Received report from Wilfred Avila RN, offgoing nurse at pts bedside. All questions answered and clinicals reviewed using SBAR format. Pt alert, oriented to person, situation, day, place (hospital, but does not know which one). Son visiting. Pt feeding self breakfast without complaints. 1130  Dr. Daquan Hoskins at bedside. Pt c/o pain in her left ankle. Orders received. 1150  Report given to Rissa Mann RN, at bedside using [employer]. All questions answered and clinicals reviewed. Ice applied to L ankle.

## 2018-06-16 NOTE — PROGRESS NOTES
Problem: Falls - Risk of  Goal: *Absence of Falls  Document Jin Fall Risk and appropriate interventions in the flowsheet. Outcome: Progressing Towards Goal  Fall Risk Interventions:  Mobility Interventions: PT Consult for mobility concerns    Mentation Interventions: Door open when patient unattended    Medication Interventions: Bed/chair exit alarm    Elimination Interventions: Bed/chair exit alarm, Call light in reach    History of Falls Interventions: Evaluate medications/consider consulting pharmacy        Problem: Pressure Injury - Risk of  Goal: *Prevention of pressure injury  Document Rodriguez Scale and appropriate interventions in the flowsheet.    Outcome: Progressing Towards Goal  Pressure Injury Interventions:  Sensory Interventions: Assess changes in LOC    Moisture Interventions: Absorbent underpads    Activity Interventions: Pressure redistribution bed/mattress(bed type)    Mobility Interventions: HOB 30 degrees or less    Nutrition Interventions: Document food/fluid/supplement intake    Friction and Shear Interventions: Apply protective barrier, creams and emollients

## 2018-06-16 NOTE — PROGRESS NOTES
[facility]                  Pulmonary, Critical Care, and Sleep Medicine     Name: Ashley Arizmendi MRN: [id number]   : 1924 Hospital: [facility] [place]    Date: 2018        PCCM Note                                            Subjective/History of Present Illness:     Patient is a 80 y.o. female with PMHx significant for HTN, chronic LBBB, GERD, ?dementia had syncope and dizziness needing ER visit on 18 when EKG showed AVB and chronic LBBB, came to ER from NH with CHB, s/p PPM placement on 6/15/18.    18:  Awake, alert  C/o soreness left chest and pain left ankle  No cough or SOB  Afebrile; BP stable  Tele-paced rhythm     cc yesterday    Allergies   Allergen Reactions    Advil [Ibuprofen] Other (comments)     Upset stomach      Past Medical History:   Diagnosis Date    Arthritis     Cancer ([facility] [address]     right breast    Cardiac murmur     Fall     GERD (gastroesophageal reflux disease)     GERD (gastroesophageal reflux disease)     Gout     Hyperlipidemia     Hyperlipidemia     Hypertension     Osteomalacia     Risk for falls       Past Surgical History:   Procedure Laterality Date    HX APPENDECTOMY      NEUROLOGICAL PROCEDURE UNLISTED      lower back surgery    RI MOD SED SAME PHYS/QHP INITIAL 15 MINS 5/> YRS  6/15/2018           Social History   Substance Use Topics    Smoking status: Never Smoker    Smokeless tobacco: Never Used    Alcohol use No        Current Facility-Administered Medications   Medication Dose Route Frequency    [START ON 2018] amLODIPine (NORVASC) tablet 2.5 mg  2.5 mg Oral DAILY    sodium chloride (NS) flush 5-10 mL  5-10 mL IntraVENous Q8H    hydroCHLOROthiazide (HYDRODIURIL) tablet 25 mg  25 mg Oral DAILY    famotidine (PF) (PEPCID) 20 mg in sodium chloride 0.9% 10 mL injection  20 mg IntraVENous Q12H    fluticasone (FLONASE) 50 mcg/actuation nasal spray 2 Spray  2 Spray Both Nostrils DAILY    ascorbic acid (vitamin C) (VITAMIN C) tablet 1,000 mg  1,000 mg Oral DAILY    sodium chloride (NS) flush 5-10 mL  5-10 mL IntraVENous Q8H         Objective:   Vital Signs:    Visit Vitals    /61    Pulse 85    Temp 98.2 °F (36.8 °C)    Resp 20    Ht 5' 4\" (1.626 m)    Wt 65.1 kg (143 lb 9.6 oz)    SpO2 96%    BMI 24.65 kg/m2       O2 Device: Room air   O2 Flow Rate (L/min): 2 l/min   Temp (24hrs), Av.4 °F (36.9 °C), Min:98.1 °F (36.7 °C), Max:98.7 °F (37.1 °C)       Intake/Output:   Last shift:       07 - 1900  In: 30 [I.V.:30]  Out: 325 [Urine:325]    Last 3 shifts: 1901 -  0700  In: 460 [P.O.:440; I.V.:20]  Out: 1925 [Urine:1925]      Intake/Output Summary (Last 24 hours) at 18 1310  Last data filed at 18 1200   Gross per 24 hour   Intake              250 ml   Output              700 ml   Net             -450 ml       Last 3 Recorded Weights in this Encounter    18 1703   Weight: 65.1 kg (143 lb 9.6 oz)         Physical Exam:     General/Neurology: Alert, Awake, comfortable. Eye:   no scleral icterus, no pallor, no cyanosis. Neck:   Supple, symmetric. No lymphadenopathy. Trachea midline  Chest wall: Left anterior chest new PPM under dressing. No hematoma or bleeding. Lung: Moderate air entry bilateral equal. No rhonchi. No wheezing. No accessory muscle use. Heart:   S1 S2 present. Systolic murmur +. No JVD. Abdomen:  Soft. Not distended. Non tender. Extremities:  Trace pitting b/l pedal edema. No cyanosis. No clubbing. Lt ankle tender. Pulses: 2+ and symmetric in DP. Capillary refill: normal  Lymphatic:  No cervical lymphadenopathy. Skin:   No rash.        Data:       Recent Results (from the past 12 hour(s))   METABOLIC PANEL, BASIC    Collection Time: 06/16/18  6:18 AM   Result Value Ref Range    Sodium 140 136 - 145 mmol/L    Potassium 4.3 3.5 - 5.5 mmol/L    Chloride 104 100 - 108 mmol/L    CO2 23 21 - 32 mmol/L    Anion gap 13 3.0 - 18 mmol/L    Glucose 94 74 - 99 mg/dL    BUN 24 (H) 7.0 - 18 MG/DL    Creatinine 1.77 (H) 0.6 - 1.3 MG/DL    BUN/Creatinine ratio 14 12 - 20      GFR est AA 32 (L) >60 ml/min/1.73m2    GFR est non-AA 27 (L) >60 ml/min/1.73m2    Calcium 8.3 (L) 8.5 - 10.1 MG/DL   CBC WITH AUTOMATED DIFF    Collection Time: 06/16/18  6:18 AM   Result Value Ref Range    WBC 5.7 4.6 - 13.2 K/uL    RBC 3.91 (L) 4.20 - 5.30 M/uL    HGB 12.0 12.0 - 16.0 g/dL    HCT 36.6 35.0 - 45.0 %    MCV 93.6 74.0 - 97.0 FL    MCH 30.7 24.0 - 34.0 PG    MCHC 32.8 31.0 - 37.0 g/dL    RDW 14.8 (H) 11.6 - 14.5 %    PLATELET 496 308 - 545 K/uL    MPV 10.1 9.2 - 11.8 FL    NEUTROPHILS 64 40 - 73 %    LYMPHOCYTES 22 21 - 52 %    MONOCYTES 12 (H) 3 - 10 %    EOSINOPHILS 2 0 - 5 %    BASOPHILS 0 0 - 2 %    ABS. NEUTROPHILS 3.7 1.8 - 8.0 K/UL    ABS. LYMPHOCYTES 1.2 0.9 - 3.6 K/UL    ABS. MONOCYTES 0.7 0.05 - 1.2 K/UL    ABS. EOSINOPHILS 0.1 0.0 - 0.4 K/UL    ABS. BASOPHILS 0.0 0.0 - 0.06 K/UL    DF AUTOMATED         Chemistry Recent Labs      06/16/18   0618  06/15/18   1323  06/15/18   0355   06/14/18   1755   GLU  94  127*  124*   --   97   NA  140  137  137   --   135*   K  4.3  4.7  5.6*   < >  5.7*   CL  104  104  105   --   103   CO2  23  24  21   --   25   BUN  24*  21*  22*   --   23*   CREA  1.77*  1.50*  1.51*   --   1.49*   CA  8.3*  8.5  8.4*   --   8.3*   MG   --    --   2.2   --   2.2   PHOS   --    --   4.7   --    --    AGAP  13  9  11   --   7   BUCR  14  14  15   --   15   AP   --    --    --    --   66   TP   --    --    --    --   6.5   ALB   --    --    --    --   3.0*   GLOB   --    --    --    --   3.5   AGRAT   --    --    --    --   0.9    < > = values in this interval not displayed.         Liver Enzymes Protein, total   Date Value Ref Range Status   06/14/2018 6.5 6.4 - 8.2 g/dL Final     Albumin   Date Value Ref Range Status   06/14/2018 3.0 (L) 3.4 - 5.0 g/dL Final     Globulin   Date Value Ref Range Status   06/14/2018 3.5 2.0 - 4.0 g/dL Final     A-G Ratio   Date Value Ref Range Status   06/14/2018 0.9 0.8 - 1.7   Final     AST (SGOT)   Date Value Ref Range Status   06/14/2018 14 (L) 15 - 37 U/L Final     Alk. phosphatase   Date Value Ref Range Status   06/14/2018 66 45 - 117 U/L Final     Recent Labs      06/14/18   1755   TP  6.5   ALB  3.0*   GLOB  3.5   AGRAT  0.9   SGOT  14*   AP  66        CBC w/Diff Recent Labs      06/16/18   0618  06/15/18   0355  06/14/18   1703   WBC  5.7  7.2  5.9   RBC  3.91*  3.94*  3.92*   HGB  12.0  12.0  12.2   HCT  36.6  36.4  36.3   PLT  163  175  217   GRANS  64  79*  64   LYMPH  22  11*  22   EOS  2  1  3        Coagulation Recent Labs      06/14/18   1703   PTP  13.6   INR  1.1   APTT  30.3         Thyroid  Lab Results   Component Value Date/Time    TSH 3.16 06/15/2018 03:55 AM       No results found for: T4     Micro  No results for input(s): SDES, CULT in the last 72 hours. No results for input(s): CULT in the last 72 hours. ABG No results for input(s): PHI, PHI, POC2, PCO2I, PO2, PO2I, HCO3, HCO3I, FIO2, FIO2I in the last 72 hours. XR LT ankle 6/16/18   Results from Hospital Encounter encounter on 06/14/18   XR ANKLE LT MIN 3 V   Narrative Left ankle     History: Left ankle pain    Comparison: No prior study    Three views of the ankle demonstrate no fracture or dislocation. Alignment is  unremarkable. Ankle mortise and talar dome are intact. Diffuse osteopenia is  present. Enthesophyte formation is seen posteriorly along the calcaneus. Impression IMPRESSION:    No fracture. Diffuse osteopenia. CXR 6/15/18  INDICATION: Pacemaker placement. Left subclavian dual-lead pacemaker has been placed and there is no  pneumothorax. Abnormal interstitium, edema or infiltrate/fibrosis is again  noted. The femoral approach temporary pacer has been removed. The heart size is  stable. IMPRESSION:  Left subclavian dual-lead pacemaker in good position and no pneumothorax. No  additional change. IMPRESSION:   · Complete heart block. S/p PPM placed 6/15/18. · HTN  · CKD - stage 3  · Hyperkalemia   · ? ? Dementia   · Hx of right breast cancer. · GERD    · Code status: Full       RECOMMENDATIONS:   Respirations stable  Stable paced rhythm and BP  On HCTZ and Amlodipine for HTN  LUE immobilized in sling for new pacemaker. Xray LT ankle - no fracture  Prophylaxis: DVT Prophylaxis with SCD. GI Prophylaxis -pepcid  Will defer respective systems problem management to primary and other respective consultant and follow patient in ICU with primary and other medical team.  Further recommendations will be based on the patient's response to recommended treatment and results of the investigation ordered. Quality Care: PPI, DVT prophylaxis, HOB elevated, Infection control all reviewed and addressed.   Moderate complexity decision making was performed during the evaluation of this patient   Transfer to tele when ok by Cardiology; would sign off once out of icu       Luis Alberto De Luna MD  6/16/2018

## 2018-06-16 NOTE — PROGRESS NOTES
Hospitalist Progress Note    Patient: Tung Gomez MRN: [id number]  CSN: [id number]    YOB: 1924  Age: 80 y.o. Sex: female    DOA: 6/14/2018 LOS:  LOS: 2 days            Assessment/Plan     1. 3rd degree heart block s/p pacer placement  2. HIEN  3. HTN controlled  4. Ankle pain    Plan:  - start gentle fluids  - continue antihypertensives  - xray ankle, ice, tylenol prn  - transfer out of ICU      Patient Active Problem List   Diagnosis Code    Breast cancer ([facility] [address] C50.919    HTN (hypertension) I10    Dyspnea R06.00    AV block, 3rd degree ([facility] [address] I44.2    Cardiac murmur R01.1    Hyperlipidemia E78.5    Hypertension I10    Risk for falls Z91.81    GERD (gastroesophageal reflux disease) K21.9    Hyperkalemia E87.5    CKD (chronic kidney disease) stage 3, GFR 30-59 ml/min N18.3               Subjective:    cc: ankle pain  Pt complains of left ankle pain today  No swelling  Denies chest pain, palpitations      REVIEW OF SYSTEMS:  General: No fevers or chills. Cardiovascular: No chest pain or pressure. No palpitations. Pulmonary: No shortness of breath. Gastrointestinal: No nausea, vomiting. Objective:        Vital signs/Intake and Output:  Visit Vitals    /54    Pulse 84    Temp 98.1 °F (36.7 °C)    Resp 17    Ht 5' 4\" (1.626 m)    Wt 65.1 kg (143 lb 9.6 oz)    SpO2 97%    BMI 24.65 kg/m2     Current Shift:  06/16 0701 - 06/16 1900  In: -   Out: 250 [Urine:250]  Last three shifts:  06/14 1901 - 06/16 0700  In: 460 [P.O.:440; I.V.:20]  Out: 1925 [Urine:1925]    Body mass index is 24.65 kg/(m^2).     Physical Exam:  GEN: Alert and oriented times three NAD  EYES: conjunctiva normal, lids with out lesions  HEENT: MMM, No thyromegaly, no lymphadenopathy  HEART: RRR +S1 +S2, no JVD, pulses 2+ distally  LUNGS: CTA B/L no wheezes, rales or rhonchi  ABDOMEN: + BS, soft NT/ND no organomegaly,  No rebound  EXTREMITIES: No edema cyanosis, cap refill normal, Pain on palpation of L ankle   SKIN: no rashes or skin breakdown, no nodules, normal turgor  Current Facility-Administered Medications   Medication Dose Route Frequency    0.9% sodium chloride infusion  75 mL/hr IntraVENous CONTINUOUS    sodium chloride (NS) flush 5-10 mL  5-10 mL IntraVENous Q8H    sodium chloride (NS) flush 5-10 mL  5-10 mL IntraVENous PRN    acetaminophen (TYLENOL) tablet 650 mg  650 mg Oral Q4H PRN    promethazine (PHENERGAN) with saline injection 12.5 mg  12.5 mg IntraVENous Q6H PRN    hydroCHLOROthiazide (HYDRODIURIL) tablet 25 mg  25 mg Oral DAILY    amLODIPine (NORVASC) tablet 5 mg  5 mg Oral DAILY    famotidine (PF) (PEPCID) 20 mg in sodium chloride 0.9% 10 mL injection  20 mg IntraVENous Q12H    fluticasone (FLONASE) 50 mcg/actuation nasal spray 2 Spray  2 Spray Both Nostrils DAILY    ascorbic acid (vitamin C) (VITAMIN C) tablet 1,000 mg  1,000 mg Oral DAILY    Electrolyte Replacement Protocol - Potassium Renal Dosing  1 Each Other PRN    Electrolyte Replacement Protocol - Magnesium   1 Each Other PRN    Electrolyte Replacement Protocol - Phosphorus Renal Dosing  1 Each Other PRN    Electrolyte Replacement Protocol - Calcium   1 Each Other PRN    sodium chloride (NS) flush 5-10 mL  5-10 mL IntraVENous Q8H    sodium chloride (NS) flush 5-10 mL  5-10 mL IntraVENous PRN    hydrALAZINE (APRESOLINE) 20 mg/mL injection 20 mg  20 mg IntraVENous Q6H PRN    carboxymethylcellulose sodium (REFRESH PLUS) 0.5 % ophthalmic solution 1 Drop  1 Drop Both Eyes PRN         All the patient's labs over the past 24 hours were reviewed both during my initial daily workflow process and at the time notated as \"note time\" in [facility]. (It is not time stamped separately in this workflow.)  Select labs are listed below.         Labs: Results:       Chemistry Recent Labs      06/16/18   0618  06/15/18   1323  06/15/18   0355   06/14/18   1755   GLU  94  127*  124*   --   97   NA  140  137  137 --   135*   K  4.3  4.7  5.6*   < >  5.7*   CL  104  104  105   --   103   CO2  23  24  21   --   25   BUN  24*  21*  22*   --   23*   CREA  1.77*  1.50*  1.51*   --   1.49*   CA  8.3*  8.5  8.4*   --   8.3*   AGAP  13  9  11   --   7   BUCR  14  14  15   --   15   AP   --    --    --    --   66   TP   --    --    --    --   6.5   ALB   --    --    --    --   3.0*   GLOB   --    --    --    --   3.5   AGRAT   --    --    --    --   0.9    < > = values in this interval not displayed.       CBC w/Diff Recent Labs      06/16/18   0618  06/15/18   0355  06/14/18   1703   WBC  5.7  7.2  5.9   RBC  3.91*  3.94*  3.92*   HGB  12.0  12.0  12.2   HCT  36.6  36.4  36.3   PLT  163  175  217   GRANS  64  79*  64   LYMPH  22  11*  22   EOS  2  1  3      Cardiac Enzymes Recent Labs      06/14/18   1755   CPK  26   CKND1  CALCULATION NOT PERFORMED WHEN RESULT IS BELOW LINEAR LIMIT      Coagulation Recent Labs      06/14/18   1703   PTP  13.6   INR  1.1   APTT  30.3               Liver Enzymes Recent Labs      06/14/18   1755   TP  6.5   ALB  3.0*   AP  66   SGOT  14*      Thyroid Studies Lab Results   Component Value Date/Time    TSH 3.16 06/15/2018 03:55 AM        Procedures/imaging: see electronic medical records for all procedures/Xrays and details which were not copied into this note but were reviewed prior to creation of [place] DO  Internal Medicine/Geriatrics

## 2018-06-16 NOTE — ROUTINE PROCESS
1125   Report received, initial assessment done, patient complaint of left ankle pain. Tylenol 2 tabs PO given. 1150   Left ankle x-ray done at bedside. 18   Dr. Joey Villalobos in to see patient, updated on patient's status. 65   Dr. Benji Clark in to see patient, orders received. 1455   Physical Therapist in to see patient. 65   Son at bedside, notified patient will be transferred to [address] [facility].

## 2018-06-16 NOTE — PROGRESS NOTES
Problem: Mobility Impaired (Adult and Pediatric)  Goal: *Acute Goals and Plan of Care (Insert Text)  Physical Therapy Goals  Initiated 6/16/2018 and to be accomplished within 3-7 day(s)  1. Patient will move from supine to sit and sit to supine  in bed with supervision/set-up. 2.  Patient will transfer from bed to chair and chair to bed with supervision/set-up using the least restrictive device. 3.  Patient will perform sit to stand with supervision/set-up. 4.  Patient will ambulate with supervision/set-up for 50 feet with the least restrictive device. Outcome: Progressing Towards Goal  physical Therapy EVALUATION    Patient: Miguel Alcantar (43 y.o. female)  Date: 6/16/2018  Primary Diagnosis: AV block, 3rd degree (HCC)  Dyspnea  HTN (hypertension)  AV block, 3rd degree (HCC)  Precautions:   Fall    ASSESSMENT :  Based on the objective data described below, the patient presents with lower extremity weakness, impaired bed mobility,  and overall limitations in functional mobility. Per chart review pt lives at [facility]. Pt poor historian and not orientated to time or situation. Unable to obtain accurate PLOF however pt states she walks with rollator. Pt with recent reports of L ankle pain, xray this morning revealed no fracture, diffuse osteopenia present. Pt performed supine to sit with MaxA. Unable to tolerate standing at this time. Patient would benefit from skilled inpatient physical therapy to address deficits, progress as tolerated to achieve long term goals and allow safe discharge. Patient will benefit from skilled intervention to address the above impairments.   Patients rehabilitation potential is considered to be Fair  Factors which may influence rehabilitation potential include:   []         None noted  [x]         Mental ability/status  [x]         Medical condition  [x]         Home/family situation and support systems  [x]         Safety awareness  [x]         Pain tolerance/management  [] Other: PLAN :  Recommendations and Planned Interventions:  [x]           Bed Mobility Training             [x]    Neuromuscular Re-Education  [x]           Transfer Training                   []    Orthotic/Prosthetic Training  [x]           Gait Training                          []    Modalities  [x]           Therapeutic Exercises          []    Edema Management/Control  [x]           Therapeutic Activities            [x]    Patient and Family Training/Education  []           Other (comment):    Frequency/Duration: Patient will be followed by physical therapy 1-2 times per day to address goals. Discharge Recommendations: Rehab  Further Equipment Recommendations for Discharge: TBD     SUBJECTIVE:   Patient stated I don't think I can do therapy today.     OBJECTIVE DATA SUMMARY:     Past Medical History:   Diagnosis Date    Arthritis     Cancer ([facility] [address]     right breast    Cardiac murmur     Fall     GERD (gastroesophageal reflux disease)     GERD (gastroesophageal reflux disease)     Gout     Hyperlipidemia     Hyperlipidemia     Hypertension     Osteomalacia     Risk for falls      Past Surgical History:   Procedure Laterality Date    HX APPENDECTOMY  1943    NEUROLOGICAL PROCEDURE UNLISTED  2010    lower back surgery    MI MOD SED SAME PHYS/QHP INITIAL 15 MINS 5/> YRS  6/15/2018          Barriers to Learning/Limitations: yes;  cognitive and altered mental status (i.e.Sedation, Confusion)  Compensate with: Visual Cues, Verbal Cues and Tactile Cues  Prior Level of Function/Home Situation: Unknown  Home Situation  Home Environment: Independent living ([facility])  # Steps to Enter: 0  Living Alone: No  Support Systems: Child(peyton)  Patient Expects to be Discharged to[de-identified] Rehabilitation facility  Current DME Used/Available at Home: gabi [employer]orton (?)  Critical Behavior:  Neurologic State: Alert;Confused  Orientation Level: Oriented to person  Cognition: Follows commands  Psychosocial  Patient Behaviors: Calm; Cooperative  Needs Expressed: Educational;Nutritional;Social/Environmental  Purposeful Interaction: Yes  Pt Identified Daily Priority: Clinical issues (comment)  Caritas Process: Attend basic human needs;Create healing environment;Supportive expression;Establish trust;Nurture loving kindness  Caring Interventions: Reassure; Therapeutic modalities  Reassure: Acceptance; Informing; Therapeutic listening;Quiet presence;Caring rounds  Therapeutic Modalities: Intentional therapeutic touch  Skin Condition/Temp: Warm;Dry  Skin Integrity: Incision (comment) (left chest s/p pacemaker insertion, right groin s/p cath)  Skin Integumentary  Skin Color: Appropriate for ethnicity; Ecchymosis (comment)  Skin Condition/Temp: Warm;Dry  Skin Integrity: Incision (comment) (left chest s/p pacemaker insertion, right groin s/p cath)  Turgor: Epidermis thin w/ loss of subcut tissue  Strength:    Strength: Generally decreased, functional  Tone & Sensation:   Tone: Normal  Sensation: Intact  Range Of Motion:  AROM: Generally decreased, functional  PROM: Generally decreased, functional  Functional Mobility:  Bed Mobility:  Supine to Sit: Maximum assistance  Sit to Supine: Maximum assistance  Balance:   Sitting: Impaired; With support  Sitting - Static: Fair (occasional)  Pain:  Pain Scale 1: Visual  Pain Intensity 1: 2  Pain Location 1: Ankle  Pain Orientation 1: Left  Pain Description 1: Aching  Pain Intervention(s) 1: Medication (see MAR)  Activity Tolerance:   Poor  Please refer to the flowsheet for vital signs taken during this treatment.   After treatment:   []         Patient left in no apparent distress sitting up in chair  [x]         Patient left in no apparent distress in bed  [x]         Call bell left within reach  [x]         Nursing notified  []         Caregiver present  []         Bed alarm activated    COMMUNICATION/EDUCATION:   [x]         Fall prevention education was provided and the patient/caregiver indicated understanding. [x]         Patient/family have participated as able in goal setting and plan of care. [x]         Patient/family agree to work toward stated goals and plan of care. []         Patient understands intent and goals of therapy, but is neutral about his/her participation. []         Patient is unable to participate in goal setting and plan of care. Thank you for this referral.  Vijay Garrett   Time Calculation: 23 mins   Eval Complexity: History: MEDIUM  Complexity : 1-2 comorbidities / personal factors will impact the outcome/ POC Exam:MEDIUM Complexity : 3 Standardized tests and measures addressing body structure, function, activity limitation and / or participation in recreation  Presentation: MEDIUM Complexity : Evolving with changing characteristics  Clinical Decision Making:Medium Complexity required MaxA for bed mobility Overall Complexity:MEDIUM Mobility  Current  CL= 60-79%   Goal  CJ= 20-39%. The severity rating is based on the Level of Assistance required for Functional Mobility and ADLs.

## 2018-06-16 NOTE — PROGRESS NOTES
Problem: Falls - Risk of  Goal: *Absence of Falls  Document Jin Fall Risk and appropriate interventions in the flowsheet.    Outcome: Progressing Towards Goal  Fall Risk Interventions:  Mobility Interventions: Communicate number of staff needed for ambulation/transfer, OT consult for ADLs, PT Consult for mobility concerns, PT Consult for assist device competence, Strengthening exercises (ROM-active/passive)    Mentation Interventions: Adequate sleep, hydration, pain control, Door open when patient unattended, Evaluate medications/consider consulting pharmacy, Eyeglasses and hearing aids, More frequent rounding, Reorient patient    Medication Interventions: Evaluate medications/consider consulting pharmacy    Elimination Interventions: Call light in reach, Patient to call for help with toileting needs, Toileting schedule/hourly rounds    History of Falls Interventions: Consult care management for discharge planning, Door open when patient unattended, Evaluate medications/consider consulting pharmacy

## 2018-06-16 NOTE — PROGRESS NOTES
1940-Bedside verbal report received from Ninfa Callejas RN. Sbar, mar, labs, kardex and patient status reviewed. Dual skin assessment performed. Previous Temporary pacer puncture site to right femoral clean, dry, and intact. New implanted pacer site clean, dry, intact and no hematoma noted. Sling loosely on left arm for immobilization. No complaints of discomfort at this time. Distal pulses palpable. Pt is pleasantly confused to situation, history of dementia and baseline for patient.

## 2018-06-16 NOTE — ROUTINE PROCESS
TRANSFER - IN REPORT:    Verbal report received from KRYSTA Webber RN(name) on Tung Gomez  being received from ICU(unit) for routine progression of care      Report consisted of patients Situation, Background, Assessment and   Recommendations(SBAR). Information from the following report(s) SBAR, Kardex, Intake/Output, MAR and Recent Results was reviewed with the receiving nurse. Opportunity for questions and clarification was provided.

## 2018-06-16 NOTE — ROUTINE PROCESS
Patient removed dressing to left chest s/p pacemaker site and cardiac monitor leads, patient re-oriented, new dressing to pacemaker site applied and cardiac monitor leads reapplied.

## 2018-06-16 NOTE — ROUTINE PROCESS
TRANSFER - OUT REPORT:    Verbal report given to CAMI Neely(name) on Yessenia Keith  being transferred to[address] [place] [address](unit) for routine progression of care       Report consisted of patients Situation, Background, Assessment and   Recommendations(SBAR). Information from the following report(s) Kardex, Intake/Output, MAR and Recent Results was reviewed with the receiving nurse. Lines:   Peripheral IV 06/14/18 Left Antecubital (Active)   Site Assessment Clean, dry, & intact 6/16/2018 11:35 AM   Phlebitis Assessment 0 6/16/2018 11:35 AM   Infiltration Assessment 0 6/16/2018 11:35 AM   Dressing Status Clean, dry, & intact 6/16/2018 11:35 AM   Dressing Type Transparent;Tape 6/16/2018 11:35 AM   Hub Color/Line Status Pink;Capped 6/16/2018 11:35 AM   Action Taken Open ports on tubing capped 6/16/2018  8:00 AM   Alcohol Cap Used Yes 6/16/2018 11:35 AM       Peripheral IV 06/15/18 Right Arm (Active)   Site Assessment Clean, dry, & intact 6/16/2018 11:35 AM   Phlebitis Assessment 0 6/16/2018 11:35 AM   Infiltration Assessment 0 6/16/2018 11:35 AM   Dressing Status Clean, dry, & intact 6/16/2018 11:35 AM   Dressing Type Transparent;Tape 6/16/2018 11:35 AM   Hub Color/Line Status Yellow 6/16/2018 11:35 AM   Action Taken Open ports on tubing capped 6/16/2018  8:00 AM   Alcohol Cap Used Yes 6/16/2018 11:35 AM        Opportunity for questions and clarification was provided.       Patient transported with:   Monitor  Patient-specific medications from Pharmacy  Registered Nurse

## 2018-06-16 NOTE — PROGRESS NOTES
Cardiology Progress Note        Patient: Davian De Leon        Sex: female          DOA: 6/14/2018  YOB: 1924      Age:  80 y.o.        LOS:  LOS: 2 days    Patient seen and examined, chart reviewed. Assessment/Plan     Patient Active Problem List   Diagnosis Code    Breast cancer ([facility] [address] C50.919    HTN (hypertension) I10    Dyspnea R06.00    AV block, 3rd degree (HCC) I44.2         Hyperlipidemia E78.5    Hypertension I10    Risk for falls Z91.81    GERD (gastroesophageal reflux disease) K21.9    Hyperkalemia E87.5    CKD (chronic kidney disease) stage 3, GFR 30-59 ml/min N18.3      Complete heart blocker s/p Temporary followed by permanent pacemaker placement   Mild to moderate aortic stenosis  Mild mitral stenosis     EKG done today reviewed: EKG revealed Sinus rhythm, A sensed V paced  Pacemaker interrogation done today revealed normal functioning dual chamber pacemaker. Plan:     Continue HCTZ  Continue Norvasc  Discontinue Hampton catheter  Ok to transfer to telemetry from cardiovascular stand point. DVT prophyalxis  Continue management as per hospital medicine  Plan discussed with Nurse. Subjective:    cc: I feel good       REVIEW OF SYSTEMS:     General: No fevers or chills. Cardiovascular: No chest pain,No palpitations, No orthopnea, No PND, No leg swelling, No claudication  Pulmonary: No shortness of breath. Gastrointestinal: No nausea, vomiting, bleeding  Neurology: No Dizziness    Objective:      Visit Vitals    /61    Pulse 85    Temp 98.2 °F (36.8 °C)    Resp 20    Ht 5' 4\" (1.626 m)    Wt 65.1 kg (143 lb 9.6 oz)    SpO2 96%    BMI 24.65 kg/m2     Body mass index is 24.65 kg/(m^2). Physical Exam:  General Appearance: Comfortable, not using accessory muscles of respiration. HEENT: MELANI. HEAD: Atraumatic  NECK: No JVD, no thyroidomeglay. CAROTIDS: No bruit  LUNGS: Clear bilaterally.    HEART: S1+S2 soft, 3/6 systolic murmur in aortic area, no pericardial rub.      ABD: Non-tender, BS Audible    NEUROLOGICAL: Alert, follows verbal commands   Medication:  Current Facility-Administered Medications   Medication Dose Route Frequency    [START ON 6/17/2018] amLODIPine (NORVASC) tablet 2.5 mg  2.5 mg Oral DAILY    sodium chloride (NS) flush 5-10 mL  5-10 mL IntraVENous Q8H    sodium chloride (NS) flush 5-10 mL  5-10 mL IntraVENous PRN    acetaminophen (TYLENOL) tablet 650 mg  650 mg Oral Q4H PRN    promethazine (PHENERGAN) with saline injection 12.5 mg  12.5 mg IntraVENous Q6H PRN    hydroCHLOROthiazide (HYDRODIURIL) tablet 25 mg  25 mg Oral DAILY    famotidine (PF) (PEPCID) 20 mg in sodium chloride 0.9% 10 mL injection  20 mg IntraVENous Q12H    fluticasone (FLONASE) 50 mcg/actuation nasal spray 2 Spray  2 Spray Both Nostrils DAILY    ascorbic acid (vitamin C) (VITAMIN C) tablet 1,000 mg  1,000 mg Oral DAILY    Electrolyte Replacement Protocol - Potassium Renal Dosing  1 Each Other PRN    Electrolyte Replacement Protocol - Magnesium   1 Each Other PRN    Electrolyte Replacement Protocol - Phosphorus Renal Dosing  1 Each Other PRN    Electrolyte Replacement Protocol - Calcium   1 Each Other PRN    sodium chloride (NS) flush 5-10 mL  5-10 mL IntraVENous Q8H    sodium chloride (NS) flush 5-10 mL  5-10 mL IntraVENous PRN    hydrALAZINE (APRESOLINE) 20 mg/mL injection 20 mg  20 mg IntraVENous Q6H PRN    carboxymethylcellulose sodium (REFRESH PLUS) 0.5 % ophthalmic solution 1 Drop  1 Drop Both Eyes PRN               Lab/Data Reviewed:       Recent Labs      06/16/18   0618  06/15/18   0355  06/14/18   1703   WBC  5.7  7.2  5.9   HGB  12.0  12.0  12.2   HCT  36.6  36.4  36.3   PLT  163  175  217     Recent Labs      06/16/18   0618  06/15/18   1323  06/15/18   0355   NA  140  137  137   K  4.3  4.7  5.6*   CL  104  104  105   CO2  23  24  21   GLU  94  127*  124*   BUN  24*  21*  22*   CREA  1.77*  1.50*  1.51*   CA 8. 3*  8.5  8.4*       Signed By: Charisma Ochoa MD     June 16, 2018

## 2018-06-17 LAB
ANION GAP SERPL CALC-SCNC: 15 MMOL/L (ref 3–18)
BASOPHILS # BLD: 0 K/UL (ref 0–0.06)
BASOPHILS NFR BLD: 0 % (ref 0–2)
BUN SERPL-MCNC: 26 MG/DL (ref 7–18)
BUN/CREAT SERPL: 17 (ref 12–20)
CALCIUM SERPL-MCNC: 8.2 MG/DL (ref 8.5–10.1)
CHLORIDE SERPL-SCNC: 102 MMOL/L (ref 100–108)
CO2 SERPL-SCNC: 21 MMOL/L (ref 21–32)
CREAT SERPL-MCNC: 1.52 MG/DL (ref 0.6–1.3)
DIFFERENTIAL METHOD BLD: ABNORMAL
EOSINOPHIL # BLD: 0 K/UL (ref 0–0.4)
EOSINOPHIL NFR BLD: 0 % (ref 0–5)
ERYTHROCYTE [DISTWIDTH] IN BLOOD BY AUTOMATED COUNT: 14.5 % (ref 11.6–14.5)
GLUCOSE BLD STRIP.AUTO-MCNC: 174 MG/DL (ref 70–110)
GLUCOSE SERPL-MCNC: 139 MG/DL (ref 74–99)
HCT VFR BLD AUTO: 33.3 % (ref 35–45)
HGB BLD-MCNC: 11.2 G/DL (ref 12–16)
LYMPHOCYTES # BLD: 0.8 K/UL (ref 0.9–3.6)
LYMPHOCYTES NFR BLD: 9 % (ref 21–52)
MAGNESIUM SERPL-MCNC: 2 MG/DL (ref 1.6–2.6)
MCH RBC QN AUTO: 31 PG (ref 24–34)
MCHC RBC AUTO-ENTMCNC: 33.6 G/DL (ref 31–37)
MCV RBC AUTO: 92.2 FL (ref 74–97)
MONOCYTES # BLD: 0.9 K/UL (ref 0.05–1.2)
MONOCYTES NFR BLD: 11 % (ref 3–10)
NEUTS SEG # BLD: 7 K/UL (ref 1.8–8)
NEUTS SEG NFR BLD: 80 % (ref 40–73)
PLATELET # BLD AUTO: 148 K/UL (ref 135–420)
PMV BLD AUTO: 10.3 FL (ref 9.2–11.8)
POTASSIUM SERPL-SCNC: 4 MMOL/L (ref 3.5–5.5)
RBC # BLD AUTO: 3.61 M/UL (ref 4.2–5.3)
SODIUM SERPL-SCNC: 138 MMOL/L (ref 136–145)
WBC # BLD AUTO: 8.8 K/UL (ref 4.6–13.2)

## 2018-06-17 PROCEDURE — 74011250636 HC RX REV CODE- 250/636: Performed by: INTERNAL MEDICINE

## 2018-06-17 PROCEDURE — 85025 COMPLETE CBC W/AUTO DIFF WBC: CPT | Performed by: HOSPITALIST

## 2018-06-17 PROCEDURE — 65660000000 HC RM CCU STEPDOWN

## 2018-06-17 PROCEDURE — 36415 COLL VENOUS BLD VENIPUNCTURE: CPT | Performed by: HOSPITALIST

## 2018-06-17 PROCEDURE — 74011000250 HC RX REV CODE- 250: Performed by: HOSPITALIST

## 2018-06-17 PROCEDURE — 77030011943

## 2018-06-17 PROCEDURE — 74011250637 HC RX REV CODE- 250/637: Performed by: INTERNAL MEDICINE

## 2018-06-17 PROCEDURE — 80048 BASIC METABOLIC PNL TOTAL CA: CPT | Performed by: HOSPITALIST

## 2018-06-17 PROCEDURE — 74011250637 HC RX REV CODE- 250/637: Performed by: HOSPITALIST

## 2018-06-17 PROCEDURE — 74011636637 HC RX REV CODE- 636/637: Performed by: INTERNAL MEDICINE

## 2018-06-17 PROCEDURE — 97530 THERAPEUTIC ACTIVITIES: CPT

## 2018-06-17 PROCEDURE — 51798 US URINE CAPACITY MEASURE: CPT

## 2018-06-17 PROCEDURE — 83735 ASSAY OF MAGNESIUM: CPT | Performed by: HOSPITALIST

## 2018-06-17 PROCEDURE — 82962 GLUCOSE BLOOD TEST: CPT

## 2018-06-17 RX ORDER — AMLODIPINE BESYLATE 5 MG/1
5 TABLET ORAL DAILY
Status: DISCONTINUED | OUTPATIENT
Start: 2018-06-18 | End: 2018-06-18 | Stop reason: HOSPADM

## 2018-06-17 RX ORDER — ONDANSETRON 4 MG/1
4 TABLET, FILM COATED ORAL
Status: DISCONTINUED | OUTPATIENT
Start: 2018-06-17 | End: 2018-06-18 | Stop reason: HOSPADM

## 2018-06-17 RX ORDER — PREDNISONE 20 MG/1
20 TABLET ORAL
Status: COMPLETED | OUTPATIENT
Start: 2018-06-17 | End: 2018-06-18

## 2018-06-17 RX ADMIN — HEPARIN SODIUM 5000 UNITS: 5000 INJECTION, SOLUTION INTRAVENOUS; SUBCUTANEOUS at 16:20

## 2018-06-17 RX ADMIN — AMLODIPINE BESYLATE 2.5 MG: 2.5 TABLET ORAL at 09:15

## 2018-06-17 RX ADMIN — Medication 1000 MG: at 09:15

## 2018-06-17 RX ADMIN — FAMOTIDINE 20 MG: 10 INJECTION, SOLUTION INTRAVENOUS at 21:40

## 2018-06-17 RX ADMIN — HEPARIN SODIUM 5000 UNITS: 5000 INJECTION, SOLUTION INTRAVENOUS; SUBCUTANEOUS at 21:40

## 2018-06-17 RX ADMIN — Medication 10 ML: at 21:40

## 2018-06-17 RX ADMIN — FAMOTIDINE 20 MG: 10 INJECTION, SOLUTION INTRAVENOUS at 09:15

## 2018-06-17 RX ADMIN — Medication 10 ML: at 21:41

## 2018-06-17 RX ADMIN — ACETAMINOPHEN 650 MG: 325 TABLET ORAL at 09:21

## 2018-06-17 RX ADMIN — HYDRALAZINE HYDROCHLORIDE 20 MG: 20 INJECTION INTRAMUSCULAR; INTRAVENOUS at 16:21

## 2018-06-17 RX ADMIN — PREDNISONE 20 MG: 20 TABLET ORAL at 12:26

## 2018-06-17 RX ADMIN — HYDROCHLOROTHIAZIDE 25 MG: 25 TABLET ORAL at 09:15

## 2018-06-17 RX ADMIN — Medication 10 ML: at 16:23

## 2018-06-17 RX ADMIN — Medication 10 ML: at 14:00

## 2018-06-17 RX ADMIN — HEPARIN SODIUM 5000 UNITS: 5000 INJECTION, SOLUTION INTRAVENOUS; SUBCUTANEOUS at 07:00

## 2018-06-17 NOTE — PROGRESS NOTES
Problem: Mobility Impaired (Adult and Pediatric)  Goal: *Acute Goals and Plan of Care (Insert Text)  Physical Therapy Goals  Initiated 6/16/2018 and to be accomplished within 3-7 day(s)  1. Patient will move from supine to sit and sit to supine  in bed with supervision/set-up. 2.  Patient will transfer from bed to chair and chair to bed with supervision/set-up using the least restrictive device. 3.  Patient will perform sit to stand with supervision/set-up. 4.  Patient will ambulate with supervision/set-up for 50 feet with the least restrictive device. Outcome: Progressing Towards Goal  physical Therapy TREATMENT    Patient: Flavia Kc (80 y.o. female)  Date: 6/17/2018  Diagnosis: AV block, 3rd degree (HCC)  Dyspnea  HTN (hypertension)  AV block, 3rd degree (HCC) AV block, 3rd degree ([address]       Precautions: Fall   Chart, physical therapy assessment, plan of care and goals were reviewed. ASSESSMENT:  Pt found with nurse Noemi Yip present and pt attempting to exit bed (alarm engaged and sounding) to use bathroom by pt report. Pt assist to sit with moderate assist of 1. Then required max assist of 2 to transfer to stand and use RW for transfer to [facility]. Pt WBAT/FWB bilaterally LE's and c/o pain both feet throughout session- severe with standing. X-ray results noted-no fractures. Pt with very mild swelling and tenderness lateral ankle, but no bruising noted. Gentle ankle ROM act/pass completed with fair tolerance. Pt left with sling in place/pillow beneath upper L arm and in NAD. Nurse Noemi Yip present. Will continue per goals. Recommend SNF; pt remains confused overall. Progression toward goals:  []      Improving appropriately and progressing toward goals  [x]      Improving slowly and progressing toward goals  []      Not making progress toward goals and plan of care will be adjusted     PLAN:  Patient continues to benefit from skilled intervention to address the above impairments. Continue treatment per established plan of care. Discharge Recommendations:  Skilled Nursing Facility  Further Equipment Recommendations for Discharge:  N/A     SUBJECTIVE:   Patient stated I have to pee.     OBJECTIVE DATA SUMMARY:   Critical Behavior:  Neurologic State: Alert, Confused  Orientation Level: Oriented to person, Oriented to place, Disoriented to time, Disoriented to situation  Cognition: Impaired decision making, Impulsive, Poor safety awareness, Follows commands  Functional Mobility Training:  Bed Mobility:  Supine to Sit: Moderate assistance  Sit to Supine: Moderate assistance  Transfers:  Sit to Stand: Maximum assistance;Assist x2  Stand to Sit: Maximum assistance;Assist x2  Bed to Chair: Maximum assistance;Assist x2  Balance:  Sitting: Intact  Sitting - Static: Fair (occasional)  Standing: Impaired; With support;Pull to stand  Standing - Static: Poor  Standing - Dynamic : Poor  Ambulation/Gait Training:  Distance (ft): 2 Feet (ft) (to /from [facility])  Assistive Device: Gait belt;Walker, rolling;Brace/Splint (L UE sling present )  Ambulation - Level of Assistance: Maximum assistance;Assist x2  Gait Abnormalities: Antalgic;Decreased step clearance; Step to gait; Shuffling gait  Right Side Weight Bearing: As tolerated; Full  Left Side Weight Bearing: As tolerated; Full  Base of Support: Narrowed  Stance: Time;Weight shift  Speed/Freida: Slow;Shuffled  Step Length: Right shortened;Left shortened  Swing Pattern: Right asymmetrical;Left asymmetrical  Interventions: Tactile cues; Safety awareness training;Verbal cues  Pain:  Pain Scale 1: PAINAD (Advanced Dementia) (Reports moderate pain feet at rest; severe on standing)  Pain Intensity 1: 0   Therapeutic Exercise:  Gentle ankle ROM Act/passive bilaterally x 10  Activity Tolerance:   Fair   Please refer to the flowsheet for vital signs taken during this treatment.   After treatment:   [] Patient left in no apparent distress sitting up in chair  [x] Patient left in no apparent distress in bed  [x] Call bell left within reach  [x] Nursing present-Kenny  [] Caregiver present  [x] Bed alarm activated      Shanell Deras, PT   Time Calculation: 25 mins

## 2018-06-17 NOTE — PROGRESS NOTES
4199:  Assumed care for patient, received bedside report from Robin Kamara. Patient lying in bed quietly, with no complaints of pain or discomfort at the time. Whiteboard updated, bed at the lowest position with call bell within reach, bed alarm activated. 5744:  PT working with patient, assisted to bedside commode. 0920:  Dr. Yuval Melo at bedside. Informed provider that patient stated she becomes nauseous when taking pills, told that an order will be put in.    1615:  Called Dr. Yuval Melo and informed that patient appeared very drowsy from this morning, vitals taken and stable. Patient is currently A paced, informed by provider to monitor patient. Also inquired about administering PRN apresoline for SBP over 180 and patient , given telephone order to administer medication. Bedside and Verbal shift change report given to Triston Lieberman RN (oncoming nurse) by Ale Guzman RN   (offgoing nurse). Report included the following information SBAR, Kardex, ED Summary, Procedure Summary, Intake/Output, MAR, Med Rec Status, Cardiac Rhythm Paced, SR with BBB and Alarm Parameters .

## 2018-06-17 NOTE — ROUTINE PROCESS
Bedside and Verbal shift change report given to Jimmy Grady RN (oncoming nurse) by RED Perry RN (offgoing nurse). Report included the following information SBAR, Kardex, Intake/Output, MAR and Recent Results.

## 2018-06-17 NOTE — PROGRESS NOTES
Cardiology Progress Note        Patient: Ivana Jara        Sex: female          DOA: 6/14/2018  YOB: 1924      Age:  80 y.o.        LOS:  LOS: 3 days    Patient seen and examined, chart reviewed  Assessment/Plan     Patient Active Problem List   Diagnosis Code         HTN (hypertension) I10    Dyspnea R06.00    AV block, 3rd degree ([address] I44.2    Cardiac murmur R01.1    Hyperlipidemia E78.5    Hypertension I10    Risk for falls Z91.81    GERD (gastroesophageal reflux disease) K21.9    Hyperkalemia E87.5    CKD (chronic kidney disease) stage 3, GFR 30-59 ml/min N18.3      Complete heart blocker s/p Temporary followed by permanent pacemaker placement   Mild to moderate aortic stenosis  Mild mitral stenosis      EKG done today reviewed: EKG revealed Sinus rhythm, A sensed V paced  Pacemaker interrogation done today revealed normal functioning dual chamber pacemaker.     Plan:      Continue management as per hospital medicine  DVT prophyalxis  Ok to discharge from cardiovascular stand point. Follow up in cardiology clinic in 7-10 days                Subjective:    cc:   Denies any chest pain or shortness of breath       REVIEW OF SYSTEMS:     General: No fevers or chills. Cardiovascular: No chest pain,No palpitations, No orthopnea, No PND, No leg swelling, No claudication  Pulmonary: No dyspnea   Gastrointestinal: No nausea, vomiting, bleeding  Neurology: No Dizziness    Objective:      Visit Vitals    /71 (BP 1 Location: Right arm, BP Patient Position: At rest;Supine; Head of bed elevated (Comment degrees))    Pulse 92    Temp 99.1 °F (37.3 °C)    Resp 16    Ht 5' 4\" (1.626 m)    Wt 59.3 kg (130 lb 11.7 oz)    SpO2 95%    BMI 22.44 kg/m2     Body mass index is 22.44 kg/(m^2). Physical Exam:  General Appearance: Comfortable, not using accessory muscles of respiration. HEENT: MELANI. HEAD: Atraumatic  NECK: No JVD, no thyroidomeglay.  CAROTIDS: No bruit  Chest: left upper chest: Pacemaker insertion site dressing is on  LUNGS: Clear bilaterally. HEART: K1+K5 soft, 3/6 systolic murmur in aortic area, no pericardial rub.      ABD: Non-tender, BS Audible    NEUROLOGICAL: Alert, follows verbal commands   Medication:  Current Facility-Administered Medications   Medication Dose Route Frequency    predniSONE (DELTASONE) tablet 20 mg  20 mg Oral DAILY WITH BREAKFAST    ondansetron hcl (ZOFRAN) tablet 4 mg  4 mg Oral Q4H PRN    [START ON 6/18/2018] amLODIPine (NORVASC) tablet 5 mg  5 mg Oral DAILY    heparin (porcine) injection 5,000 Units  5,000 Units SubCUTAneous Q8H    sodium chloride (NS) flush 5-10 mL  5-10 mL IntraVENous Q8H    sodium chloride (NS) flush 5-10 mL  5-10 mL IntraVENous PRN    acetaminophen (TYLENOL) tablet 650 mg  650 mg Oral Q4H PRN    promethazine (PHENERGAN) with saline injection 12.5 mg  12.5 mg IntraVENous Q6H PRN    famotidine (PF) (PEPCID) 20 mg in sodium chloride 0.9% 10 mL injection  20 mg IntraVENous Q12H    fluticasone (FLONASE) 50 mcg/actuation nasal spray 2 Spray  2 Spray Both Nostrils DAILY    ascorbic acid (vitamin C) (VITAMIN C) tablet 1,000 mg  1,000 mg Oral DAILY    sodium chloride (NS) flush 5-10 mL  5-10 mL IntraVENous Q8H    sodium chloride (NS) flush 5-10 mL  5-10 mL IntraVENous PRN    hydrALAZINE (APRESOLINE) 20 mg/mL injection 20 mg  20 mg IntraVENous Q6H PRN    carboxymethylcellulose sodium (REFRESH PLUS) 0.5 % ophthalmic solution 1 Drop  1 Drop Both Eyes PRN               Lab/Data Reviewed:       Recent Labs      06/17/18   0418  06/16/18   0618  06/15/18   0355   WBC  8.8  5.7  7.2   HGB  11.2*  12.0  12.0   HCT  33.3*  36.6  36.4   PLT  148  163  175     Recent Labs      06/17/18   0418  06/16/18 2024  06/16/18   0618   NA  138  135*  140   K  4.0  4.3  4.3   CL  102  102  104   CO2  21  26  23   GLU  139*  148*  94   BUN  26*  28*  24*   CREA  1.52*  1.68*  1.77*   CA  8.2*  8.1*  8.3*       Signed By: Sima Wiseman MD     June 17, 2018

## 2018-06-17 NOTE — PROGRESS NOTES
0230:  Patient requesting to go to bathroom. Unable to stand up due to pain in left ankle. Patient repositioned in bed and placed on bedpan. 0235:  Checked on patient, did not void. Patient states, \"I tried and tired, but nothing would come. \"  Bladder scan performed, patient with 782, 750, 687 on repeat checks. Dr. Xavi Lr paged for orders. 0244:  Orders received to straight cath patient one time now. 5714:  Patient straight cathed. 650 ml clear yellow urine out. Patient tolerated well.

## 2018-06-17 NOTE — PROGRESS NOTES
Hospitalist Progress Note    Patient: Tung Gomez MRN: [id number]  CSN: [id number]    YOB: 1924  Age: 80 y.o. Sex: female    DOA: 6/14/2018 LOS:  LOS: 3 days            Assessment/Plan   1. 3rd degree heart block sp pacer   2. HIEN, improving  3. HTN   4. Ankle pain ? Gout she reports history of this in past    Plan:  - will give prednisone for ankle pain  - contineu antihypertensives  - hold hctz  - increase norvasc  - full code, on heparin, continue    Patient Active Problem List   Diagnosis Code    [facility] C50.919    HTN (hypertension) I10    Dyspnea R06.00    AV block, 3rd degree (HCC) I44.2    Cardiac murmur R01.1    Hyperlipidemia E78.5    Hypertension I10    Risk for falls Z91.81    GERD (gastroesophageal reflux disease) K21.9    Hyperkalemia E87.5    CKD (chronic kidney disease) stage 3, GFR 30-59 ml/min N18.3               Subjective:    cc: \" my ankles hurt\"  No acute events overnight  Pacer is functioning  Co bilateral ankle pain    REVIEW OF SYSTEMS:  General: No fevers or chills. Cardiovascular: No chest pain or pressure. No palpitations. Pulmonary: No shortness of breath. Gastrointestinal: No nausea, vomiting. Objective:        Vital signs/Intake and Output:  Visit Vitals    BP (!) 158/99 (BP 1 Location: Right arm, BP Patient Position: At rest;Supine; Head of bed elevated (Comment degrees))    Pulse (!) 101    Temp 98.9 °F (37.2 °C)    Resp 16    Ht 5' 4\" (1.626 m)    Wt 59.3 kg (130 lb 11.7 oz)    SpO2 92%    BMI 22.44 kg/m2     Current Shift:  06/17 0701 - 06/17 1900  In: -   Out: 250 [Urine:250]  Last three shifts:  06/15 1901 - 06/17 0700  In: 150 [P.O.:120; I.V.:30]  Out: 1450 [Urine:1450]    Body mass index is 22.44 kg/(m^2).     Physical Exam:  GEN: Alert and oriented times three NAD  EYES: conjunctiva normal, lids with out lesions  HEENT: MMM, No thyromegaly, no lymphadenopathy  HEART: RRR +S1 +S2, no JVD, pulses 2+ distally  LUNGS: CTA B/L no wheezes, rales or rhonchi  ABDOMEN: + BS, soft NT/ND no organomegaly,  No rebound  EXTREMITIES: No edema cyanosis, cap refill normal, bilat tendernerss medial malleolus, minimal swelling   SKIN: no rashes or skin breakdown, no nodules, normal turgor  Current Facility-Administered Medications   Medication Dose Route Frequency    predniSONE (DELTASONE) tablet 20 mg  20 mg Oral DAILY WITH BREAKFAST    ondansetron hcl (ZOFRAN) tablet 4 mg  4 mg Oral Q4H PRN    amLODIPine (NORVASC) tablet 2.5 mg  2.5 mg Oral DAILY    heparin (porcine) injection 5,000 Units  5,000 Units SubCUTAneous Q8H    sodium chloride (NS) flush 5-10 mL  5-10 mL IntraVENous Q8H    sodium chloride (NS) flush 5-10 mL  5-10 mL IntraVENous PRN    acetaminophen (TYLENOL) tablet 650 mg  650 mg Oral Q4H PRN    promethazine (PHENERGAN) with saline injection 12.5 mg  12.5 mg IntraVENous Q6H PRN    famotidine (PF) (PEPCID) 20 mg in sodium chloride 0.9% 10 mL injection  20 mg IntraVENous Q12H    fluticasone (FLONASE) 50 mcg/actuation nasal spray 2 Spray  2 Spray Both Nostrils DAILY    ascorbic acid (vitamin C) (VITAMIN C) tablet 1,000 mg  1,000 mg Oral DAILY    sodium chloride (NS) flush 5-10 mL  5-10 mL IntraVENous Q8H    sodium chloride (NS) flush 5-10 mL  5-10 mL IntraVENous PRN    hydrALAZINE (APRESOLINE) 20 mg/mL injection 20 mg  20 mg IntraVENous Q6H PRN    carboxymethylcellulose sodium (REFRESH PLUS) 0.5 % ophthalmic solution 1 Drop  1 Drop Both Eyes PRN         All the patient's labs over the past 24 hours were reviewed both during my initial daily workflow process and at the time notated as \"note time\" in [facility]. (It is not time stamped separately in this workflow.)  Select labs are listed below.         Labs: Results:       Chemistry Recent Labs      06/17/18   0418  06/16/18 2024  06/16/18   0618   06/14/18   1755   GLU  139*  148*  94   < >  97   NA  138  135*  140   < >  135*   K  4.0  4.3 4.3   < >  5.7*   CL  102  102  104   < >  103   CO2  21  26  23   < >  25   BUN  26*  28*  24*   < >  23*   CREA  1.52*  1.68*  1.77*   < >  1.49*   CA  8.2*  8.1*  8.3*   < >  8.3*   AGAP  15  7  13   < >  7   BUCR  17  17  14   < >  15   AP   --    --    --    --   66   TP   --    --    --    --   6.5   ALB   --    --    --    --   3.0*   GLOB   --    --    --    --   3.5   AGRAT   --    --    --    --   0.9    < > = values in this interval not displayed.       CBC w/Diff Recent Labs      06/17/18   0418  06/16/18   0618  06/15/18   0355   WBC  8.8  5.7  7.2   RBC  3.61*  3.91*  3.94*   HGB  11.2*  12.0  12.0   HCT  33.3*  36.6  36.4   PLT  148  163  175   GRANS  80*  64  79*   LYMPH  9*  22  11*   EOS  0  2  1      Cardiac Enzymes Recent Labs      06/14/18   1755   CPK  26   CKND1  CALCULATION NOT PERFORMED WHEN RESULT IS BELOW LINEAR LIMIT      Coagulation Recent Labs      06/14/18   1703   PTP  13.6   INR  1.1   APTT  30.3               Liver Enzymes Recent Labs      06/14/18   1755   TP  6.5   ALB  3.0*   AP  66   SGOT  14*      Thyroid Studies Lab Results   Component Value Date/Time    TSH 3.16 06/15/2018 03:55 AM        Procedures/imaging: see electronic medical records for all procedures/Xrays and details which were not copied into this note but were reviewed prior to creation of Plan    \  Radha Finch, DO  Internal Medicine/Geriatrics

## 2018-06-18 VITALS
TEMPERATURE: 98.2 F | HEART RATE: 88 BPM | RESPIRATION RATE: 20 BRPM | BODY MASS INDEX: 22.52 KG/M2 | DIASTOLIC BLOOD PRESSURE: 71 MMHG | OXYGEN SATURATION: 93 % | SYSTOLIC BLOOD PRESSURE: 142 MMHG | WEIGHT: 131.9 LBS | HEIGHT: 64 IN

## 2018-06-18 PROBLEM — Z95.0 PACEMAKER: Status: ACTIVE | Noted: 2018-06-18

## 2018-06-18 LAB
ANION GAP SERPL CALC-SCNC: 12 MMOL/L (ref 3–18)
BUN SERPL-MCNC: 30 MG/DL (ref 7–18)
BUN/CREAT SERPL: 22 (ref 12–20)
CALCIUM SERPL-MCNC: 8.6 MG/DL (ref 8.5–10.1)
CHLORIDE SERPL-SCNC: 102 MMOL/L (ref 100–108)
CO2 SERPL-SCNC: 23 MMOL/L (ref 21–32)
CREAT SERPL-MCNC: 1.35 MG/DL (ref 0.6–1.3)
GLUCOSE SERPL-MCNC: 134 MG/DL (ref 74–99)
MAGNESIUM SERPL-MCNC: 2.1 MG/DL (ref 1.6–2.6)
POTASSIUM SERPL-SCNC: 4 MMOL/L (ref 3.5–5.5)
SODIUM SERPL-SCNC: 137 MMOL/L (ref 136–145)

## 2018-06-18 PROCEDURE — 74011636637 HC RX REV CODE- 636/637: Performed by: INTERNAL MEDICINE

## 2018-06-18 PROCEDURE — 83735 ASSAY OF MAGNESIUM: CPT | Performed by: HOSPITALIST

## 2018-06-18 PROCEDURE — 36415 COLL VENOUS BLD VENIPUNCTURE: CPT | Performed by: HOSPITALIST

## 2018-06-18 PROCEDURE — 97530 THERAPEUTIC ACTIVITIES: CPT

## 2018-06-18 PROCEDURE — 80048 BASIC METABOLIC PNL TOTAL CA: CPT | Performed by: HOSPITALIST

## 2018-06-18 PROCEDURE — 74011250637 HC RX REV CODE- 250/637: Performed by: INTERNAL MEDICINE

## 2018-06-18 PROCEDURE — 74011250636 HC RX REV CODE- 250/636: Performed by: INTERNAL MEDICINE

## 2018-06-18 PROCEDURE — 74011000250 HC RX REV CODE- 250: Performed by: HOSPITALIST

## 2018-06-18 PROCEDURE — 74011250637 HC RX REV CODE- 250/637: Performed by: HOSPITALIST

## 2018-06-18 PROCEDURE — 97110 THERAPEUTIC EXERCISES: CPT

## 2018-06-18 RX ORDER — AMLODIPINE BESYLATE 5 MG/1
5 TABLET ORAL DAILY
Qty: 30 TAB | Refills: 0 | Status: SHIPPED | OUTPATIENT
Start: 2018-06-19

## 2018-06-18 RX ADMIN — AMLODIPINE BESYLATE 5 MG: 5 TABLET ORAL at 09:30

## 2018-06-18 RX ADMIN — HEPARIN SODIUM 5000 UNITS: 5000 INJECTION, SOLUTION INTRAVENOUS; SUBCUTANEOUS at 05:43

## 2018-06-18 RX ADMIN — Medication 1000 MG: at 09:30

## 2018-06-18 RX ADMIN — FAMOTIDINE 20 MG: 10 INJECTION, SOLUTION INTRAVENOUS at 09:30

## 2018-06-18 RX ADMIN — PREDNISONE 20 MG: 20 TABLET ORAL at 09:30

## 2018-06-18 NOTE — DISCHARGE SUMMARY
Discharge Summary    Patient: Tung Gomez MRN: [id number]  CSN: [id number]    YOB: 1924  Age: 80 y.o. Sex: female    DOA: 6/14/2018 LOS:  LOS: 4 days   Discharge Date:      Primary Care Provider:  Jonathan Hernandez MD    Admission Diagnoses: AV block, 3rd degree ([facility] [address]  Dyspnea  HTN (hypertension)  AV block, 3rd degree [facility])    Discharge Diagnoses:    Hospital Problems  Date Reviewed: 6/15/2018          Codes Class Noted POA    Pacemaker ICD-10-CM: Z95.0  ICD-9-CM: V45.01  6/18/2018 Unknown        HTN (hypertension) ICD-10-CM: I10  ICD-9-CM: 401.9  6/14/2018 Unknown        Dyspnea ICD-10-CM: R06.00  ICD-9-CM: 786.09  6/14/2018 Unknown        * (Principal)AV block, 3rd degree ([facility] [address] ICD-10-CM: I44.2  ICD-9-CM: 426.0  6/14/2018 Unknown        Cardiac murmur ICD-10-CM: R01.1  ICD-9-CM: 785.2  6/14/2018 Unknown        Hyperlipidemia ICD-10-CM: E78.5  ICD-9-CM: 272.4  6/14/2018 Unknown        Hypertension ICD-10-CM: I10  ICD-9-CM: 401.9  6/14/2018 Unknown        Risk for falls ICD-10-CM: Z91.81  ICD-9-CM: V15.88  6/14/2018 Unknown        GERD (gastroesophageal reflux disease) ICD-10-CM: K21.9  ICD-9-CM: 530.81  6/14/2018 Unknown        Hyperkalemia ICD-10-CM: E87.5  ICD-9-CM: 276.7  6/14/2018 Unknown        CKD (chronic kidney disease) stage 3, GFR 30-59 ml/min ICD-10-CM: N18.3  ICD-9-CM: 585.3  6/14/2018 Unknown              Discharge Condition: Stable    Discharge Medications:     Current Discharge Medication List      START taking these medications    Details   amLODIPine (NORVASC) 5 mg tablet Take 1 Tab by mouth daily. Qty: 30 Tab, Refills: 0         CONTINUE these medications which have NOT CHANGED    Details   dextran 70-hypromellose (ARTIFICIAL TEARS,[id number],) ophthalmic solution Administer  to both eyes as needed. fluticasone (FLONASE) 50 mcg/actuation nasal spray 2 Sprays by Both Nostrils route daily.       peg 400-propylene glycol (SYSTANE, PROPYLENE GLYCOL,) 0.4-0.3 % drop as needed. famotidine (PEPCID) 20 mg tablet Take 20 mg by mouth two (2) times a day. Aspirin, Buffered 81 mg tab Take  by mouth daily. carica papaya (PAPAYA ENZYME) chew Take  by mouth daily. omega-3 fatty acids-vitamin e (FISH OIL) 1,000 mg cap Take 1 Cap by mouth daily. CALCIUM CARBONATE/VITAMIN D3 (CALTRATE 600 + D PO) Take  by mouth daily. BIOTIN PO Take 1,000 mcg by mouth daily. flaxseed oil 1,000 mg cap Take  by mouth daily. ascorbic acid (VITAMIN C) 500 mg tablet Take 1,000 mg by mouth daily. FEVERFEW PO Take  by mouth daily. Cholecalciferol, Vitamin D3, 1,000 unit cap Take  by mouth daily. cinnamon bark (CINNAMON) 500 mg cap Take 1,000 mg by mouth daily. STOP taking these medications       potassium chloride (K-DUR, KLOR-CON) 20 mEq tablet Comments:   Reason for Stopping:               Procedures : transvenous pacemaker and pacemaker     Consults: Cardiology      PHYSICAL EXAM   Visit Vitals    /71 (BP 1 Location: Right arm, BP Patient Position: At rest)    Pulse 85    Temp 98.4 °F (36.9 °C)    Resp 20    Ht 5' 4\" (1.626 m)    Wt 59.8 kg (131 lb 14.4 oz)    SpO2 93%    BMI 22.64 kg/m2     General: Awake, cooperative, no acute distress    HEENT: NC, Atraumatic. PERRLA, EOMI. Anicteric sclerae. Lungs:  CTA Bilaterally. No Wheezing/Rhonchi/Rales. Surgical site covered with gauze   Heart:  Regular  rhythm,  + murmur, No Rubs, No Gallops  Abdomen: Soft, Non distended, Non tender. +Bowel sounds,   Extremities: No c/c/e  Psych:   Not anxious or agitated. Neurologic:  No acute neurological deficits. Admission HPI :   Miguel Alcantar is a 80 y.o. female who hx of ?dementia, hard hearing, heart murmur. HTN ,breast cancer was sent from assistant living due to leg swelling. She is not a good historian. Per ER reported she had recent syncope episode, ekg indicated 1st degree av block.  She was found 3rd degree av block in er and elevated BP. She is asymptomatic. Intensive and cardiology were consulted in er and family want to have procedure done. Denies any slurred speech/headache/cp/n/v/blurred vission/d/c/palpitation/gait change/bleeding. Denies smoking/ any alcohol or drug use.        Hospital Course :   She was admitted to icu due completed  Av block. Dr. Benji Clark was consulted, transvenous pacemaker was placed. permanent pacemaker was placed per Dr. Tessa Alvarado. She tolerated the procedure very well. She was cleared to be d/c from cardiology point, her bp was controlled per norvasc. Family agrees with the snf replacement. Her bp was well controlled and cr got improving. She remained hemodynamic stable. She also presented mild hyperkalemia and resolved per kayexalate. Activity: No heavy lifting, pushing, pulling with the implant side for 2 months    Diet: Cardiac Diet    Follow-up: pcm and cardiology     Disposition: snf     Minutes spent on discharge: 50 min       Labs: Results:       Chemistry Recent Labs      06/18/18   0343  06/17/18 0418  06/16/18 2024   GLU  134*  139*  148*   NA  137  138  135*   K  4.0  4.0  4.3   CL  102  102  102   CO2  23  21  26   BUN  30*  26*  28*   CREA  1.35*  1.52*  1.68*   CA  8.6  8.2*  8.1*   AGAP  12  15  7   BUCR  22*  17  17      CBC w/Diff Recent Labs      06/17/18 0418  06/16/18 0618   WBC  8.8  5.7   RBC  3.61*  3.91*   HGB  11.2*  12.0   HCT  33.3*  36.6   PLT  148  163   GRANS  80*  64   LYMPH  9*  22   EOS  0  2      Cardiac Enzymes No results for input(s): CPK, CKND1, LAKEISHA in the last 72 hours. No lab exists for component: CKRMB, TROIP   Coagulation No results for input(s): PTP, INR, APTT in the last 72 hours.     No lab exists for component: INREXT, INREXT    Lipid Panel No results found for: CHOL, CHOLPOCT, CHOLX, CHLST, CHOLV, 713709, HDL, LDL, LDLC, DLDLP, 364855, VLDLC, VLDL, TGLX, TRIGL, TRIGP, TGLPOCT, CHHD, CHHDX   BNP No results for input(s): BNPP in the last 72 hours. Liver Enzymes No results for input(s): TP, ALB, TBIL, AP, SGOT, GPT in the last 72 hours. No lab exists for component: DBIL   Thyroid Studies Lab Results   Component Value Date/Time    TSH 3.16 06/15/2018 03:55 AM            Significant Diagnostic Studies: Xr Ankle Lt Min 3 V    Result Date: 6/16/2018  Left ankle History: Left ankle pain Comparison: No prior study Three views of the ankle demonstrate no fracture or dislocation. Alignment is unremarkable. Ankle mortise and talar dome are intact. Diffuse osteopenia is present. Enthesophyte formation is seen posteriorly along the calcaneus. IMPRESSION: No fracture. Diffuse osteopenia. Xr Chest Port    Result Date: 6/15/2018  AP portable chest, 6/15/2018 at 0943 hours: INDICATION: Pacemaker placement. Left subclavian dual-lead pacemaker has been placed and there is no pneumothorax. Abnormal interstitium, edema or infiltrate/fibrosis is again noted. The femoral approach temporary pacer has been removed. The heart size is stable. IMPRESSION: Left subclavian dual-lead pacemaker in good position and no pneumothorax. No additional change. Xr Chest Port    Result Date: 6/15/2018  --------------------------------------------------------------------------- <<<<<<<<<           [facility]           >>>>>>>>> --------------------------------------------------------------------------- CLINICAL HISTORY:  Chest pain. Shortness of breath. COMPARISON EXAMINATIONS:  June 1, 2018. ---  SINGLE FRONTAL VIEW OF THE CHEST  --- There is mild bilateral increased central markings greater at the lung bases similar to previous. There is no focal consolidation or pleural effusion. The cardiomediastinal silhouette is stable. No significant osseous abnormalities are identified.  --------------    Impression: -------------- Bilateral mild increased interstitial markings which were present previously and are likely chronic.  No focal consolidation or pleural effusion. No significant interval change. Xr Chest Port    Result Date: 6/14/2018  --------------------------------------------------------------------------- <<<<<<<<<           [facility]           >>>>>>>>> --------------------------------------------------------------------------- CLINICAL HISTORY:  Pacemaker placement. COMPARISON EXAMINATIONS:  Previous of the same day. ---  SINGLE FRONTAL VIEW OF THE CHEST  --- The cardiomediastinal silhouette is stable. Mild diffuse increased markings are again seen similar to previous. There is no focal consolidation or significant pleural effusion. A pacer lead is seen extending from the abdomen with tip overlying the mid inferior heart. No significant osseous abnormalities are identified.  --------------    Impression: -------------- Pacer lead in place. Mild diffuse increased social markings were seen previously and may be chronic. There is no focal consolidation or pleural effusion. No pneumothorax. Xr Chest Port    Result Date: 6/1/2018  A portable AP radiograph of the chest was obtained at 1154 hours: INDICATION:  Dizziness, shortness of breath. COMPARISON:  5/6/2009. FINDINGS:  Heart and mediastinum: Unremarkable. Lungs and pleura: Clear without consolidation or pleural effusion. Lungs are hypoinflated. Cardiac pads overlie the right hemithorax. Aorta: Mildly tortuous and partially calcified. Bones: Degenerative changes are seen throughout the spine. Other: None. Impression: Hypoinflation, otherwise unremarkable.              [facility]     CC: Antonette Camarillo MD

## 2018-06-18 NOTE — PROGRESS NOTES
0730:  Assumed care for patient, received bedside report from Ki Keith RN. Patient lying in bed, resting quietly with no complaints of pain or discomfort at the time. Whiteboard updated, bed at the lowest position with call bell within reach. 1250: Informed that patient is clear for discharge, preparing patient for discharge. 1305:  [facility], spoke with Patricia Ibarra LPN. All questions answered.

## 2018-06-18 NOTE — PROGRESS NOTES
Occupational Therapy Evaluation/Treatment Attempt    Chart reviewed. Attempted Occupational Therapy Evaluation/Treatment, however, patient unable to be seen due to:  []  Nausea/vomiting  []  Eating  []  Pain  []  Patient too lethargic  []  Off Unit for testing/procedure  []  Dialysis treatment in progress   []  Telemetry Results  [x]  Other:  Patient discharged to Rehab prior to Assessment. Will f/u later as patient's schedule allows.    Thank you for this referral.  Nadya Parr, OTR/L

## 2018-06-18 NOTE — ROUTINE PROCESS
Bedside and Verbal shift change report given to Zoila Schofield RN (oncoming nurse) by RED Perry RN (offgoing nurse). Report included the following information SBAR, Kardex, Intake/Output, MAR and Recent Results.

## 2018-06-18 NOTE — PROGRESS NOTES
D/c plan: Rehab  Met with patient and her son at bedside. They have asked to submit to rehab. WALT Forrest at [address], [place]. [place] [facility], [place], [address] [facility] [address] [address]. Robert Bob Patient lives at [facility] in a assisted living. She does have a rolling walker and would like to return to [facility] upon d/c. Son states if she gets accepted today she will be ready today and he can drive her today    4532 telephone call from Rakel Dillard at / Stevo Sanders  at [address], [place]. She can accommodate patient today at 1300. Met wit patient and telephone call with son he will  Transport her over there at 1300. RN please call report to WALT Forrest at [address]. [phone] for report. Please include all hard scripts for controlled substances, med rec and dc summary in packet. Please medicate for pain prior to dc if possible and needed to help offset delay when patient first arrives to facility. Care Management Interventions  PCP Verified by CM:  Yes  Transition of Care Consult (CM Consult): SNF  Partner SNF: Yes  Physical Therapy Consult: Yes  Occupational Therapy Consult: Yes  Current Support Network: Assisted Living  Confirm Follow Up Transport: Family  Plan discussed with Pt/Family/Caregiver: Yes  Freedom of Choice Offered: Yes  Discharge Location  Discharge Placement: Skilled nursing facility

## 2018-06-19 NOTE — PROGRESS NOTES
Problem: Mobility Impaired (Adult and Pediatric)  Goal: *Acute Goals and Plan of Care (Insert Text)  Physical Therapy Goals  Initiated 6/16/2018 and to be accomplished within 3-7 day(s)  1. Patient will move from supine to sit and sit to supine  in bed with supervision/set-up. 2.  Patient will transfer from bed to chair and chair to bed with supervision/set-up using the least restrictive device. 3.  Patient will perform sit to stand with supervision/set-up. 4.  Patient will ambulate with supervision/set-up for 50 feet with the least restrictive device. Outcome: Progressing Towards Goal  physical Therapy TREATMENT    Patient: Corey Brothers (89 y.o. female)  Date: 6/18/2018  Diagnosis: AV block, 3rd degree (HCC)  Dyspnea  HTN (hypertension)  AV block, 3rd degree (HCC) AV block, 3rd degree ([address]  Precautions: Fall   Chart, physical therapy assessment, plan of care and goals were reviewed. ASSESSMENT:  Pt able to complete bed mobility tasks with min assist and transfers sit <>stand with moderate assist today. Not c/o pain bilateral feet. Demonstrates good static and fair dynamic sitting balance EOB during completion of therapeutic ex as noted below. Unable to take steps; static standing balance fair at bed with constant support. Pt returned to supine and left sitting up in bed in NAD. Pt awaiting transport to SNF for f/u rehab. Overall slow progress. Progression toward goals:  []      Improving appropriately and progressing toward goals  [x]      Improving slowly and progressing toward goals  []      Not making progress toward goals and plan of care will be adjusted     PLAN:  Patient continues to benefit from skilled intervention to address the above impairments. Continue treatment per established plan of care. Discharge Recommendations:  Skilled Nursing Facility  Further Equipment Recommendations for Discharge:  N/A     SUBJECTIVE:   Patient stated I'm waiting for my son.     OBJECTIVE DATA SUMMARY:   Critical Behavior:  Neurologic State: Alert, Confused  Orientation Level: Disoriented to situation  Cognition: Follows commands, Impaired decision making  Functional Mobility Training:  Bed Mobility:  Supine to Sit: Minimum assistance  Sit to Supine: Minimum assistance  Transfers:  Sit to Stand: Moderate assistance  Stand to Sit: Moderate assistance  Balance:  Sitting: Intact  Sitting - Static: Good (unsupported)  Sitting - Dynamic: Fair (occasional)  Standing: Impaired;Pull to stand; With support  Standing - Static: Fair;Constant support  Therapeutic Exercises:   Seated EOB: HR x 20, TR x 20, LAQ 2x5, hip marching x10  Pain:  Pain Scale 1: Numeric (0 - 10)  Pain Intensity 1: 0  Activity Tolerance:   Fair   Please refer to the flowsheet for vital signs taken during this treatment.   After treatment:   [] Patient left in no apparent distress sitting up in chair  [x] Patient left in no apparent distress in bed  [x] Call bell left within reach  [] Nursing notified  [] Caregiver present  [] Bed alarm activated      Yamile Bethea, PT   Time Calculation: 29 mins

## 2021-08-03 PROBLEM — I10 HTN (HYPERTENSION): Status: RESOLVED | Noted: 2018-06-14 | Resolved: 2021-08-03
